# Patient Record
Sex: MALE | Race: WHITE | Employment: OTHER | URBAN - METROPOLITAN AREA
[De-identification: names, ages, dates, MRNs, and addresses within clinical notes are randomized per-mention and may not be internally consistent; named-entity substitution may affect disease eponyms.]

---

## 2021-06-03 ENCOUNTER — TRANSCRIBE ORDER (OUTPATIENT)
Dept: SCHEDULING | Age: 71
End: 2021-06-03

## 2021-06-03 DIAGNOSIS — Z01.89 RADIOLOGICAL EXAMINATION, NOT ELSEWHERE CLASSIFIED: Primary | ICD-10-CM

## 2021-06-09 ENCOUNTER — HOSPITAL ENCOUNTER (OUTPATIENT)
Dept: MRI IMAGING | Age: 71
Discharge: HOME | End: 2021-06-09
Attending: INTERNAL MEDICINE

## 2021-06-09 DIAGNOSIS — Z01.89 RADIOLOGICAL EXAMINATION, NOT ELSEWHERE CLASSIFIED: ICD-10-CM

## 2022-12-03 ENCOUNTER — HOSPITAL ENCOUNTER (EMERGENCY)
Age: 72
Discharge: HOME | End: 2022-12-03
Attending: EMERGENCY MEDICINE
Payer: MEDICARE

## 2022-12-03 ENCOUNTER — APPOINTMENT (OUTPATIENT)
Dept: GENERAL RADIOLOGY | Age: 72
End: 2022-12-03
Attending: EMERGENCY MEDICINE
Payer: MEDICARE

## 2022-12-03 VITALS
BODY MASS INDEX: 38.51 KG/M2 | RESPIRATION RATE: 18 BRPM | HEIGHT: 69 IN | HEART RATE: 112 BPM | SYSTOLIC BLOOD PRESSURE: 132 MMHG | WEIGHT: 260 LBS | DIASTOLIC BLOOD PRESSURE: 64 MMHG | TEMPERATURE: 98.2 F | OXYGEN SATURATION: 94 %
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
## 2022-12-03 DIAGNOSIS — M25.559 HIP PAIN: Primary | ICD-10-CM

## 2022-12-03 PROCEDURE — 73502 X-RAY EXAM HIP UNI 2-3 VIEWS: CPT

## 2022-12-03 PROCEDURE — 99283 EMERGENCY DEPT VISIT LOW MDM: CPT

## 2022-12-03 RX ORDER — ASPIRIN 325 MG
325 TABLET ORAL DAILY
COMMUNITY

## 2022-12-03 NOTE — ED NOTES
Pt given discharge instructions, patient education, 0 prescriptions, and follow up information. Pt verbalizes understanding. All questions answered. Pt discharged to home in private vehicle, ambulatory. Pt A&Ox4, RA, pain controlled.

## 2022-12-03 NOTE — ED PROVIDER NOTES
Date of Service:  12/3/2022    Patient:  Eulalia Zimmerman    Chief Complaint:  Fall and Hip Pain       HPI:  Eulalia Zimmerman is a 71 y.o.  male who presents for evaluation of left hip pain. Patient had a fall getting out of the shower when he states that his left hip/leg gave out on him. He notes chronic issues with his left hip with chronic discomfort. The pain he is experiencing now is the normal type of pain he has only it is worse today since the fall. No numbness or tingling. Patient able to ambulate. He denies head strike loss of consciousness any other head neck or back pain. No other acute complaints       History reviewed. No pertinent past medical history. History reviewed. No pertinent surgical history. History reviewed. No pertinent family history. Social History     Socioeconomic History    Marital status:      Spouse name: Not on file    Number of children: Not on file    Years of education: Not on file    Highest education level: Not on file   Occupational History    Not on file   Tobacco Use    Smoking status: Never    Smokeless tobacco: Never   Vaping Use    Vaping Use: Never used   Substance and Sexual Activity    Alcohol use: Not on file    Drug use: Never    Sexual activity: Not on file   Other Topics Concern    Not on file   Social History Narrative    Not on file     Social Determinants of Health     Financial Resource Strain: Not on file   Food Insecurity: Not on file   Transportation Needs: Not on file   Physical Activity: Not on file   Stress: Not on file   Social Connections: Not on file   Intimate Partner Violence: Not on file   Housing Stability: Not on file         ALLERGIES: Patient has no known allergies. Review of Systems   All other systems reviewed and are negative.     Vitals:    12/03/22 0945 12/03/22 0947 12/03/22 1002   BP: 134/62 134/82 127/65   Pulse: (!) 112     Resp: 18     Temp: 98.2 °F (36.8 °C)     SpO2: 94% 96% 94%   Weight: 117.9 kg (260 lb) Height: 5' 9\" (1.753 m)              Physical Exam  Vitals and nursing note reviewed. Constitutional:       Appearance: Normal appearance. HENT:      Head: Normocephalic and atraumatic. Eyes:      General: No scleral icterus. Cardiovascular:      Rate and Rhythm: Normal rate. Pulmonary:      Effort: Pulmonary effort is normal.   Abdominal:      General: There is no distension. Musculoskeletal:         General: Tenderness (pain about the left hip) present. No swelling. Normal range of motion. Skin:     General: Skin is warm. Neurological:      Mental Status: He is alert and oriented to person, place, and time. Sensory: No sensory deficit. Motor: No weakness. Psychiatric:         Mood and Affect: Mood normal.        MDM     VITAL SIGNS:  Patient Vitals for the past 4 hrs:   BP SpO2   12/03/22 1046 132/64 94 %   12/03/22 1038 -- 92 %         LABS:  No results found for this or any previous visit (from the past 6 hour(s)). IMAGING:  XR HIP LT W OR WO PELV 2-3 VWS   Final Result   severe left hip osteoarthritis with flattening of the superior femoral head   articular surface, may reflect an age indeterminate subchondral collapse. Medications During Visit:  Medications - No data to display      DECISION MAKING:  Tatianna Trinh is a 71 y.o. male who comes in as above. Chronic left hip pain, worse today after fall but no acute findings. Patient has orthopedic follow-up next week to discuss private options for replacement [facility]      IMPRESSION:  1. Hip pain    2.  Fall, initial encounter        DISPOSITION:  Discharged      Discharge Medication List as of 12/3/2022 11:21 AM           Follow-up Information       Follow up With Specialties Details Why Contact Info    Your PCP  Schedule an appointment as soon as possible for a visit       Your Specialist  Schedule an appointment as soon as possible for a visit                 The patient is asked to follow-up with their primary care provider in the next several days. They are to call tomorrow for an appointment. The patient is asked to return promptly for any increased concerns or worsening of symptoms. They can return to this emergency department or any other emergency department.       Procedures

## 2022-12-03 NOTE — ED TRIAGE NOTES
Pt ambulatory with cane into ED w/ cc of left hip pain due to GLF. Denies dizziness or chest pain. Pt states pain is 6/10. Pain does not radiate. Pt states he has had a \"bad hip\" for the past year. Pt was in the shower and fell down. Denies LOC and slide down on his arm.

## 2022-12-12 ENCOUNTER — OFFICE VISIT (OUTPATIENT)
Dept: ORTHOPEDIC SURGERY | Age: 72
End: 2022-12-12
Payer: MEDICARE

## 2022-12-12 VITALS — WEIGHT: 260 LBS | BODY MASS INDEX: 38.51 KG/M2 | HEIGHT: 69 IN

## 2022-12-12 DIAGNOSIS — M16.12 ARTHRITIS OF LEFT HIP: Primary | ICD-10-CM

## 2022-12-12 PROCEDURE — 3017F COLORECTAL CA SCREEN DOC REV: CPT | Performed by: ORTHOPAEDIC SURGERY

## 2022-12-12 PROCEDURE — G8536 NO DOC ELDER MAL SCRN: HCPCS | Performed by: ORTHOPAEDIC SURGERY

## 2022-12-12 PROCEDURE — G8417 CALC BMI ABV UP PARAM F/U: HCPCS | Performed by: ORTHOPAEDIC SURGERY

## 2022-12-12 PROCEDURE — 99205 OFFICE O/P NEW HI 60 MIN: CPT | Performed by: ORTHOPAEDIC SURGERY

## 2022-12-12 PROCEDURE — 1101F PT FALLS ASSESS-DOCD LE1/YR: CPT | Performed by: ORTHOPAEDIC SURGERY

## 2022-12-12 PROCEDURE — G8427 DOCREV CUR MEDS BY ELIG CLIN: HCPCS | Performed by: ORTHOPAEDIC SURGERY

## 2022-12-12 PROCEDURE — 1123F ACP DISCUSS/DSCN MKR DOCD: CPT | Performed by: ORTHOPAEDIC SURGERY

## 2022-12-12 PROCEDURE — G8432 DEP SCR NOT DOC, RNG: HCPCS | Performed by: ORTHOPAEDIC SURGERY

## 2022-12-12 RX ORDER — ATORVASTATIN CALCIUM 80 MG/1
TABLET, FILM COATED ORAL
COMMUNITY
Start: 2022-11-30

## 2022-12-12 RX ORDER — CARVEDILOL 25 MG/1
25 TABLET ORAL 2 TIMES DAILY WITH MEALS
COMMUNITY

## 2022-12-12 RX ORDER — GABAPENTIN 300 MG/1
CAPSULE ORAL
COMMUNITY
Start: 2022-10-05

## 2022-12-12 RX ORDER — METFORMIN HYDROCHLORIDE 500 MG/1
TABLET ORAL
COMMUNITY
Start: 2022-09-14

## 2022-12-12 RX ORDER — GUAIFENESIN 600 MG/1
600 TABLET, EXTENDED RELEASE ORAL 2 TIMES DAILY
COMMUNITY

## 2022-12-12 RX ORDER — LISINOPRIL 40 MG/1
TABLET ORAL
COMMUNITY
Start: 2022-04-25 | End: 2023-04-26

## 2022-12-12 RX ORDER — FOLIC ACID 1 MG/1
TABLET ORAL
COMMUNITY
Start: 2022-10-26

## 2022-12-12 RX ORDER — FINASTERIDE 5 MG/1
TABLET, FILM COATED ORAL
COMMUNITY
Start: 2022-11-30

## 2022-12-12 RX ORDER — CHOLECALCIFEROL (VITAMIN D3) 125 MCG
CAPSULE ORAL
COMMUNITY

## 2022-12-12 RX ORDER — IBUPROFEN 400 MG/1
TABLET ORAL
COMMUNITY
Start: 2022-03-23 | End: 2023-03-24

## 2022-12-12 NOTE — PROGRESS NOTES
Soniya Sainz (: 1950) is a 71 y.o. male patient, here for evaluation of the following chief complaint(s):  Hip Pain (Left hip pain/)       ASSESSMENT/PLAN:  Below is the assessment and plan developed based on review of pertinent history, physical exam, labs, studies, and medications. 29-year-old male comes in today complaining of left-sided hip pain. He has been doing this for several years at the [address] [place] [address].  He says he has had multiple steroid injections into the left hip. He says the pain is severe with stairs as well as severe while walking on uneven surface. He has extreme pain rising from sitting as well as bending over to  an object. He has moderate to severe pain while lying in bed and sitting. He walks with a severe limp. He takes metformin and has diabetes but says his blood sugars are well controlled. His x-rays reveal severe destruction of his left hip joint with bone-on-bone changes and erosion of the acetabulum. Discussed these findings with him. He is very interested in moving forward surgery. He understands significant risk because of diabetes BMI and bony destruction of the hip joint. He would like to move forward with this. He wants to schedule this at [facility].  We will also get an ESR and CRP considering his significant hip changes. Risks and benefits of joint arthroplasty discussed at length including but not limited to bleeding, need for blood transfusion, infection, damage to surrounding structures, intra-operative fracture, blood clots, pulmonary embolism, death. The patient understands the risks of surgery. All questions answered. They elected to move forward. 1. Arthritis of left hip      Encounter Diagnosis   Name Primary? Arthritis of left hip Yes        No follow-ups on file.       SUBJECTIVE/OBJECTIVE:  Soniya Sainz (: 1950) is a 71 y.o. male who presents today for the following:  Chief Complaint   Patient presents with    Hip Pain     Left hip pain         80-year-old male comes in today complaining of left-sided hip pain. He has been doing this for several years at the [facility].  He says he has had multiple steroid injections into the left hip. He says the pain is severe with stairs as well as severe while walking on uneven surface. He has extreme pain rising from sitting as well as bending over to  an object. He has moderate to severe pain while lying in bed and sitting. He walks with a severe limp. He takes metformin and has diabetes but says his blood sugars are well controlled. IMAGING:    I independently reviewed his x-rays from an outside source. These reveal severe changes of the left hip with significant hip joint destruction and no remaining joint space. He has bone-on-bone with large osteophyte and bone spur formation as well as subchondral cysts    XR Results (most recent):  Results from Hospital Encounter encounter on 12/03/22    XR HIP LT W OR WO PELV 2-3 VWS    Narrative  EXAM: XR HIP LT W OR WO PELV 2-3 VWS    INDICATION: fall, left hip[ pain. COMPARISON: None. FINDINGS: AP view of the pelvis and a frogleg lateral view of the left hip  demonstrate severe left hip osteoarthritis with flattening of the superior  femoral head articular surface, may reflect an age indeterminate subchondral  collapse. No malalignment. Vascular calcifications. Impression  severe left hip osteoarthritis with flattening of the superior femoral head  articular surface, may reflect an age indeterminate subchondral collapse.        No Known Allergies    Current Outpatient Medications   Medication Sig    gabapentin (NEURONTIN) 300 mg capsule     ibuprofen (MOTRIN) 400 mg tablet TAKE ONE TABLET BY MOUTH THREE TIMES A DAY AS NEEDED FOR PAIN AS DIRECTED    folic acid (FOLVITE) 1 mg tablet     metFORMIN (GLUCOPHAGE) 500 mg tablet     lisinopriL (PRINIVIL, ZESTRIL) 40 mg tablet TAKE ONE TABLET BY MOUTH ONCE DAILY WILL REPLACE ENALAPRIL. THIS IS FOR BLOOD PRESSURE AND TO PROTECT THE  KIDNEYS    finasteride (PROSCAR) 5 mg tablet     atorvastatin (LIPITOR) 80 mg tablet     cholecalciferol, vitamin D3, (Vitamin D3) 50 mcg (2,000 unit) tab Take  by mouth.    guaiFENesin ER (Mucinex) 600 mg ER tablet Take 600 mg by mouth two (2) times a day. carvediloL (COREG) 25 mg tablet Take 25 mg by mouth two (2) times daily (with meals). aspirin (ASPIRIN) 325 mg tablet Take 325 mg by mouth daily. No current facility-administered medications for this visit. No past medical history on file. No past surgical history on file. No family history on file. Social History     Tobacco Use    Smoking status: Never    Smokeless tobacco: Never   Substance Use Topics    Alcohol use: Not on file        All systems reviewed x 12 and were negative with the exception of None      No flowsheet data found. Vitals:  Ht 5' 9\" (1.753 m)   Wt 260 lb (117.9 kg)   BMI 38.40 kg/m²    Body mass index is 38.4 kg/m². Physical Exam    General: NAD, well developed, well nourished. Cardiac: Extremities well perfused. Respiratory: Nonlabored breathing. RLE: No antalgic gait. Negative stinchfield. 0-100 degrees of flexion. >20 degrees internal rotation, >30 degrees external rotation. Negative ANNAMARIE. No pain with flexion adduction and internal rotation. .  Motor strength grossly intact. LLE: Obvious antalgic gait.  stinchfield. Left shorter than right clinically. 0-100 degrees of flexion. 0 degrees internal rotation, >30 degrees external rotation. Negative ANNAMARIE. Severe pain with flexion adduction and internal rotation. .  Motor strength grossly intact. Skin: Warm well perfused. Vascular: Palpable pedal pulses bilaterally. Equal. Capillary refill less than 2 seconds. An electronic signature was used to authenticate this note.   -- Ancelmo Finn MD

## 2022-12-14 DIAGNOSIS — M16.12 ARTHRITIS OF LEFT HIP: Primary | ICD-10-CM

## 2022-12-20 ENCOUNTER — HOSPITAL ENCOUNTER (OUTPATIENT)
Dept: PREADMISSION TESTING | Age: 72
Discharge: HOME | End: 2022-12-20
Attending: ORTHOPAEDIC SURGERY
Payer: MEDICARE

## 2022-12-20 VITALS
RESPIRATION RATE: 20 BRPM | TEMPERATURE: 97.7 F | DIASTOLIC BLOOD PRESSURE: 81 MMHG | OXYGEN SATURATION: 99 % | WEIGHT: 268.52 LBS | BODY MASS INDEX: 42.15 KG/M2 | HEART RATE: 81 BPM | HEIGHT: 67 IN | SYSTOLIC BLOOD PRESSURE: 158 MMHG

## 2022-12-20 LAB
ABO + RH BLD: NORMAL
ALBUMIN SERPL-MCNC: 3.7 G/DL (ref 3.5–5)
ALBUMIN/GLOB SERPL: 0.9 {RATIO} (ref 1.1–2.2)
ALP SERPL-CCNC: 84 U/L (ref 45–117)
ALT SERPL-CCNC: 22 U/L (ref 12–78)
ANION GAP SERPL CALC-SCNC: 7 MMOL/L (ref 5–15)
APPEARANCE UR: CLEAR
APTT PPP: 26.5 SEC (ref 22.1–31)
AST SERPL-CCNC: 12 U/L (ref 15–37)
BACTERIA URNS QL MICRO: NEGATIVE /HPF
BASOPHILS # BLD: 0.1 K/UL (ref 0–0.1)
BASOPHILS NFR BLD: 1 % (ref 0–1)
BILIRUB SERPL-MCNC: 0.6 MG/DL (ref 0.2–1)
BILIRUB UR QL: NEGATIVE
BLOOD GROUP ANTIBODIES SERPL: NORMAL
BUN SERPL-MCNC: 18 MG/DL (ref 6–20)
BUN/CREAT SERPL: 24 (ref 12–20)
CALCIUM SERPL-MCNC: 9.4 MG/DL (ref 8.5–10.1)
CHLORIDE SERPL-SCNC: 105 MMOL/L (ref 97–108)
CO2 SERPL-SCNC: 28 MMOL/L (ref 21–32)
COLOR UR: NORMAL
COMMENT, HOLDF: NORMAL
CREAT SERPL-MCNC: 0.75 MG/DL (ref 0.7–1.3)
CRP SERPL-MCNC: 1.61 MG/DL (ref 0–0.6)
DIFFERENTIAL METHOD BLD: ABNORMAL
EOSINOPHIL # BLD: 0.1 K/UL (ref 0–0.4)
EOSINOPHIL NFR BLD: 1 % (ref 0–7)
EPITH CASTS URNS QL MICRO: NORMAL /LPF
ERYTHROCYTE [DISTWIDTH] IN BLOOD BY AUTOMATED COUNT: 14.9 % (ref 11.5–14.5)
ERYTHROCYTE [SEDIMENTATION RATE] IN BLOOD: 43 MM/HR (ref 0–20)
EST. AVERAGE GLUCOSE BLD GHB EST-MCNC: 97 MG/DL
GLOBULIN SER CALC-MCNC: 4.2 G/DL (ref 2–4)
GLUCOSE SERPL-MCNC: 93 MG/DL (ref 65–100)
GLUCOSE UR STRIP.AUTO-MCNC: NEGATIVE MG/DL
HBA1C MFR BLD: 5 % (ref 4–5.6)
HCT VFR BLD AUTO: 35.8 % (ref 36.6–50.3)
HGB BLD-MCNC: 11.3 G/DL (ref 12.1–17)
HGB UR QL STRIP: NEGATIVE
HYALINE CASTS URNS QL MICRO: NORMAL /LPF (ref 0–2)
IMM GRANULOCYTES # BLD AUTO: 0 K/UL (ref 0–0.04)
IMM GRANULOCYTES NFR BLD AUTO: 0 % (ref 0–0.5)
INR PPP: 1 (ref 0.9–1.1)
KETONES UR QL STRIP.AUTO: NEGATIVE MG/DL
LEUKOCYTE ESTERASE UR QL STRIP.AUTO: NEGATIVE
LYMPHOCYTES # BLD: 1 K/UL (ref 0.8–3.5)
LYMPHOCYTES NFR BLD: 12 % (ref 12–49)
MCH RBC QN AUTO: 30.8 PG (ref 26–34)
MCHC RBC AUTO-ENTMCNC: 31.6 G/DL (ref 30–36.5)
MCV RBC AUTO: 97.5 FL (ref 80–99)
MONOCYTES # BLD: 0.8 K/UL (ref 0–1)
MONOCYTES NFR BLD: 9 % (ref 5–13)
NEUTS SEG # BLD: 6.4 K/UL (ref 1.8–8)
NEUTS SEG NFR BLD: 77 % (ref 32–75)
NITRITE UR QL STRIP.AUTO: NEGATIVE
NRBC # BLD: 0 K/UL (ref 0–0.01)
NRBC BLD-RTO: 0 PER 100 WBC
PH UR STRIP: 5.5 [PH] (ref 5–8)
PLATELET # BLD AUTO: 260 K/UL (ref 150–400)
PMV BLD AUTO: 10.4 FL (ref 8.9–12.9)
POTASSIUM SERPL-SCNC: 3.8 MMOL/L (ref 3.5–5.1)
PROT SERPL-MCNC: 7.9 G/DL (ref 6.4–8.2)
PROT UR STRIP-MCNC: NEGATIVE MG/DL
PROTHROMBIN TIME: 10.8 SEC (ref 9–11.1)
RBC # BLD AUTO: 3.67 M/UL (ref 4.1–5.7)
RBC #/AREA URNS HPF: NORMAL /HPF (ref 0–5)
SAMPLES BEING HELD,HOLD: NORMAL
SODIUM SERPL-SCNC: 140 MMOL/L (ref 136–145)
SP GR UR REFRACTOMETRY: 1.03
SPECIMEN EXP DATE BLD: NORMAL
THERAPEUTIC RANGE,PTTT: NORMAL SECS (ref 58–77)
UA: UC IF INDICATED,UAUC: NORMAL
UROBILINOGEN UR QL STRIP.AUTO: 0.2 EU/DL (ref 0.2–1)
WBC # BLD AUTO: 8.4 K/UL (ref 4.1–11.1)
WBC URNS QL MICRO: NORMAL /HPF (ref 0–4)

## 2022-12-20 PROCEDURE — 86900 BLOOD TYPING SEROLOGIC ABO: CPT

## 2022-12-20 PROCEDURE — 85610 PROTHROMBIN TIME: CPT

## 2022-12-20 PROCEDURE — 80053 COMPREHEN METABOLIC PANEL: CPT

## 2022-12-20 PROCEDURE — 85730 THROMBOPLASTIN TIME PARTIAL: CPT

## 2022-12-20 PROCEDURE — 86140 C-REACTIVE PROTEIN: CPT

## 2022-12-20 PROCEDURE — 97161 PT EVAL LOW COMPLEX 20 MIN: CPT

## 2022-12-20 PROCEDURE — 81001 URINALYSIS AUTO W/SCOPE: CPT

## 2022-12-20 PROCEDURE — 36415 COLL VENOUS BLD VENIPUNCTURE: CPT

## 2022-12-20 PROCEDURE — 85652 RBC SED RATE AUTOMATED: CPT

## 2022-12-20 PROCEDURE — 93005 ELECTROCARDIOGRAM TRACING: CPT

## 2022-12-20 PROCEDURE — 83036 HEMOGLOBIN GLYCOSYLATED A1C: CPT

## 2022-12-20 PROCEDURE — 85025 COMPLETE CBC W/AUTO DIFF WBC: CPT

## 2022-12-20 RX ORDER — SODIUM CHLORIDE, SODIUM LACTATE, POTASSIUM CHLORIDE, CALCIUM CHLORIDE 600; 310; 30; 20 MG/100ML; MG/100ML; MG/100ML; MG/100ML
25 INJECTION, SOLUTION INTRAVENOUS CONTINUOUS
OUTPATIENT
Start: 2023-01-03

## 2022-12-20 RX ORDER — TAMSULOSIN HYDROCHLORIDE 0.4 MG/1
0.8 CAPSULE ORAL DAILY
COMMUNITY

## 2022-12-20 RX ORDER — GUAIFENESIN 600 MG/1
600 TABLET, EXTENDED RELEASE ORAL
COMMUNITY

## 2022-12-20 NOTE — PERIOP NOTES

## 2022-12-20 NOTE — PERIOP NOTES
[facility]  Joint/Spine Preoperative Instructions    Surgery Date January 3          Time of Arrival to be called  Contact#[phone]  1. On the day of your surgery, please report to the Surgical Services Registration Desk and sign in at your designated time. The Surgery Center is located to the right of the Emergency Room. 2. You must have someone with you to drive you home. You should not drive a car for 24 hours following surgery. Please make arrangements for a friend or family member to stay with you for the first 24 hours after your surgery. 3. No food after midnight January 2  Medications morning of surgery should be taken with a sip of water. Please follow pre-surgery drink instructions that were given at your Pre Admission Testing appointment. 4. We recommend you do not drink any alcoholic beverages for 24 hours before and after your surgery. 5. Contact your surgeons office for instructions on the following medications: non-steroidal anti-inflammatory drugs (i.e. Advil, Aleve), vitamins, and supplements. (Some surgeons will want you to stop these medications prior to surgery and others may allow you to take them)  **If you are currently taking Plavix, Coumadin, Aspirin and/or other blood-thinning agents, contact your surgeon for instructions. ** Your surgeon will partner with the physician prescribing these medications to determine if it is safe to stop or if you need to continue taking. Please do not stop taking these medications without instructions from your surgeon    6. Wear comfortable clothes. Wear glasses instead of contacts. Do not bring any money or jewelry. Please bring picture ID, insurance card, and any prearranged co-payment or hospital payment. Do not wear make-up, particularly mascara the morning of your surgery. Do not wear nail polish, particularly if you are having foot /hand surgery.   Wear your hair loose or down, no ponytails, buns, gaston pins or clips. All body piercings must be removed. Please shower with antibacterial soap for three consecutive days before and on the morning of surgery, but do not apply any lotions, powders or deodorants after the shower on the day of surgery. Please use a fresh towels after each shower. Please sleep in clean clothes and change bed linens the night before surgery. Please do not shave for 48 hours prior to surgery. Shaving of the face is acceptable. 7. You should understand that if you do not follow these instructions your surgery may be cancelled. If your physical condition changes (I.e. fever, cold or flu) please contact your surgeon as soon as possible. 8. It is important that you be on time. If a situation occurs where you may be late, please call [phone] (OR Holding Area). 9. If you have any questions and or problems, please call [phone] (Pre-admission Testing). 10. Your surgery time may be subject to change. You will receive a phone call the evening prior with your time of arrival.    11.  If having outpatient surgery, you must have someone to drive you here, stay with you during the duration of your stay, and to drive you home at time of discharge. 12. The following link is for the educational video for patients and/or families. [email or web address].[email or web address]-fcodbib-[email or web address]-materials    Special Instructions: Follow your physician/surgeon instructions for holding all non-steroidal anti-inflammatory drugs/NSAIDs, blood-thinning agents, vitamins & supplements prior to surgery.      NEED to get directions on ASPIRIN,Ibuprofen, and Salfasalazine from Dr Anderson Mina incentive spirometry twice a day -ten times each and bring day of surgery    TAKE ALL MEDICATIONS THE DAY OF SURGERY EXCEPT:Metformin,Vitamins/supplements  (Take Lisinopril, Coreg with small sip of water or presurgery drinks)      I understand a pre-operative phone call will be made to verify my surgery time. In the event that I am not available, I give permission for a message to be left on my answering service and/or with another person?   yes         ___________________      __________   _________    (Signature of Patient)             (Witness)                (Date and Time)

## 2022-12-20 NOTE — PERIOP NOTES
Orthopedic and Spine Patients: Instructions on When You Can   Eat or Drink Before Surgery      You have been provided a pre-surgery drink received at your pre-admission testing appointment. Night before surgery: You should drink 1/2 bottle of the  pre-surgery drink at bedtime. No food after midnight! Day of Surgery:  Complete 2nd half of the bottle of the pre-surgery drink 1 hour prior to arrival at hospital.  For questions call Pre-Admission Testing at [phone]. They are available from 8:00am-5:00pm, Monday through Friday.

## 2022-12-20 NOTE — PERIOP NOTES

## 2022-12-20 NOTE — PROGRESS NOTES
Problem: At Risk for Falls  Goal: # Patient does not fall  Outcome: Outcome Met, Continue evaluating goal progress toward completion  Goal: # Takes action to control fall-related risks  Outcome: Outcome Met, Continue evaluating goal progress toward completion     Problem: Breathing Pattern Ineffective  Goal: Air exchange is effective, demonstrated by Sp02 sat of greater then or = 92% (or as ordered)  Outcome: Outcome Met, Continue evaluating goal progress toward completion  Goal: Respiratory pattern is quiet and regular without report of SOB  Outcome: Outcome Met, Continue evaluating goal progress toward completion  Goal: Breathing pattern demonstrates minimal apnea during sleep with appropriate use of airway pressure support devices  Outcome: Outcome Met, Continue evaluating goal progress toward completion     Problem: VTE, Risk for  Goal: # No s/s of VTE  Outcome: Outcome Met, Continue evaluating goal progress toward completion  Goal: Demonstrates ability to administer injectable anticoagulants if ordered for d/c  Description: Document education using the patient education activity.  Outcome: Outcome Met, Continue evaluating goal progress toward completion     Problem: Fluid Volume Deficit  Goal: Vital signs are maintained within parameters  Outcome: Outcome Met, Continue evaluating goal progress toward completion  Goal: Oral intake is resumed and tolerated  Outcome: Outcome Met, Continue evaluating goal progress toward completion  Goal: Genitourinary function returned to baseline  Outcome: Outcome Met, Continue evaluating goal progress toward completion      Patient attended the Joint Replacement Education Class at [facility]. The content of the class was presented using a power point presentation specific for patients undergoing hip and knee replacement surgery. The hospitals Joint Replacement Education Handbook was given to the patient. Preparing for surgery, day of surgery routine and expectations, discharge process and help at home expectations, nutrition,medications, infection control, pain management, DVT prevention, ice therapy and safety were reviewed in class. Opportunity for questions provided, patient verbalized understanding of instructions.

## 2022-12-20 NOTE — PERIOP NOTES
Hibiclens/Chlorhexidine    Preventing Infections Before and After - Your Surgery    IMPORTANT INSTRUCTIONS    Please read and follow these instructions carefully. If you are unable to comply with the below instructions your procedure will be cancelled. Every Night for Three (3) nights before your surgery:  Shower with an antibacterial soap, such as Dial, or the soap provided at your preassessment appointment. A shower is better than a bath for cleaning your skin. If needed, ask someone to help you reach all areas of your body. Dont forget to clean your belly button with every shower. The night before your surgery: If you lose your Hibiclens/chlorhexidine please contact surgery center or you can purchase it at a local pharmacy  On the night before your surgery, shower with an antibacterial soap, such as Dial, or the soap provided at your preassessment appointment. With one packet of Hibiclens/Chlorhexidine in hand, turn water off. Apply Hibiclens antiseptic skin cleanser with a clean, freshly washed washcloth. Gently apply to your body from chin to toes (except the genital area) and especially the area(s) where your incision(s) will be. Leave Hibiclens/Chlorhexidine on your skin for at least 20 seconds. CAUTION: If needed, Hibiclens/chlorhexidine may be used to clean the folds of skin of the legs (such as in the area of the groin) and on your buttocks and hips. However, do not use Hibiclens/Chlorhexidine above the neck or in the genital area (your bottom) or put inside any area of your body. Turn the water back on and rinse. Dry gently with a clean, freshly washed towel. After your shower, do not use any powder, deodorant, perfumes or lotion. Use clean, freshly washed towels and washcloths every time you shower. Wear clean, freshly washed pajamas to bed the night before surgery. Sleep on clean, freshly washed sheets. Do not allow pets to sleep in your bed with you.         The Morning of your surgery:  Shower again thoroughly with an antibacterial soap, such as Dial or the soap provided at your preassessment appointment. If needed, ask someone for help to reach all areas of your body. Dont forget to clean your belly button! Rinse. Dry gently with a clean, freshly washed towel. After your shower, do not use any powder, deodorant, perfumes or lotion prior to surgery. Put on clean, freshly washed clothing. Tips to help prevent infections after your surgery:  Protect your surgical wound from germs:  Hand washing is the most important thing you and your caregivers can do to prevent infections. Keep your bandage clean and dry! Do not touch your surgical wound. Use clean, freshly washed towels and washcloths every time you shower; do not share bath linens with others. Until your surgical wound is healed, wear clothing and sleep on bed linens each day that are clean and freshly washed. Do not allow pets to sleep in your bed with you or touch your surgical wound. Do not smoke - smoking delays wound healing. This may be a good time to stop smoking. If you have diabetes, it is important for you to manage your blood sugar levels properly before your surgery as well as after your surgery. Poorly managed blood sugar levels slow down wound healing and prevent you from healing completely. If you lose your Hibiclens/chlorhexidine, please call the [facility], or it is available for purchase at your pharmacy.                ___________________      ___________________      ________________  (Signature of Patient)          (Witness)                   (Date and Time)

## 2022-12-20 NOTE — PROGRESS NOTES
[facility]  Physical Therapy Pre-surgery evaluation  [address]  [place], [address] [facility]    PHYSICAL THERAPY PRE THR SURGERY EVALUATION  Patient: Maria De Jesus Parker (72 y.o. male)  Date: 12/20/2022  Primary Diagnosis: PAT  Procedure(s) (LRB):  LEFT TOTAL HIP ARTHROPLASTY ANTERIOR APPROACH (Left)     Precautions: None       ASSESSMENT :  Based on the objective data described below, the patient presents with increased fall risk, impaired gait, balance, pain, and overall high level functional mobility due to end stage degenerative joint disease in the left hip. Discussed anticipated disposition to home with possible discharge within a 1 to 2 day time frame post-surgery. Patient in agreement, patient has no  arranged. Reports his current plan is to have his son check in on him every evening. Therapist strongly encouraging patient to arrange for more help and suggested have son & DIL stay with him for a few days instead of only having them check on him 1x/day. GOALS: (Goals have been discussed and agreed upon with patient.)  DISCHARGE GOALS: Time Frame: 1 DAY  Patient will demonstrate increased strength, range of motion, and pain control via a home exercise program in order to minimize functional deficits in preparation for their upcoming surgery. This will be achieved by using education, demonstration and through the use of an informational handout including a home exercise program.  REHABILITATION POTENTIAL FOR STATED GOALS: Good     RECOMMENDATIONS AND PLANNED INTERVENTIONS: (Benefits and precautions of physical therapy have been discussed with the patient.)  Home Exercise Program  TREATMENT PLAN EFFECTIVE DATES: 12/20/2022 TO 12/20/2022  FREQUENCY/DURATION: Patient to continue to perform home exercise program at least twice daily until his surgery. SUBJECTIVE:   Patient stated I did fall out of the shower 2 weeks ago, that is why I am here.     OBJECTIVE DATA SUMMARY: HISTORY:    Past Medical History:   Diagnosis Date    At risk for sleep apnea     DEVAUGHN 4- was tested years ago- and none    Autoimmune disease ([facility] [address]     rheumatoid arthritis    Bell's palsy     Diabetes ([facility] [address]     \"prediabetic\"    Hypertension     Lyme disease     Psoriasis     Psychiatric disorder     anxiety    PUD (peptic ulcer disease)     Staph infection     left arm     Past Surgical History:   Procedure Laterality Date    HX HEART CATHETERIZATION      HX ORTHOPAEDIC      left arm repair     Prior Level of Function/Home Situation: Indep with ambulation, intermittently uses cane depending on how badly his hip is hurting. Indep with ADLs. On bad days the pain is pretty constant and increases with prolonged sitting. Jey Shoulder out of shower 2 weeks ago, he is not entirely sure what happened but it was when he was stepping over the lip into the shower. Personal factors and/or comorbidities impacting plan of care: Lives alone, hx of falls, Anxiety    Home Situation  Home Environment: Private residence  # Steps to Enter: 2  Rails to Enter: No  Wheelchair Ramp: Yes  One/Two Story Residence: Two story (bedroom and shower upstairs)  # of Interior Steps: 15  Interior Rails: Right  Living Alone: Yes  Support Systems: Child(carmina) (Son)  Current DME Used/Available at Home: Grab bars, Walker, rollator, Cane, straight  Tub or Shower Type: Shower    EXAMINATION/PRESENTATION/DECISION MAKING:     ADLs (Current Functional Status): Bathing/Showering:   [x] Independent  [] Requires Assistance from Someone  [] Sponge Bath Only   Ambulation:  [x] Independent  [] Walk Indoors Only  [] Walk Outdoors  [] Use Assistive Device  [] Use Wheelchair Only     Dressing:   [x] Independent    Requires Assistance from Someone for:  [] Sock/Shoes  [] Pants  [] Everything   Household Activities:  [] Routine house and yard work  [x] Light Housework Only  [] None     Strength:    Strength:  Within functional limits (L hip 3/5)  Tone & Sensation:   Tone: Normal  Sensation: Intact  Range Of Motion:  AROM: Generally decreased, functional (L hip)  Coordination:  Coordination: Within functional limits    Functional Mobility:  Transfers:  Sit to Stand: Modified independent  Stand to Sit: Modified independent  Balance:   Sitting: Intact  Standing: Intact  Ambulation/Gait Training:  Distance (ft): 150 Feet (ft)  Ambulation - Level of Assistance: Independent  Gait Abnormalities: Antalgic, Altered arm swing, Trunk sway increased  Base of Support: Widened, Shift to right  Speed/Jeri: Slow    Therapeutic Exercises:   The patient was educated in, has demonstrated, and has received written instructions to complete for their home exercise program per total hip replacement protocol. Functional Measure:  10 Meter walk test:  (Specify if any supplemental oxygen is used, the type, pre, during and post sats.)    Self-Selected Or Fast-Velocity: Self Selected Velocity  Trial 1 (Time to Walk 10 Meters): 17.5 Seconds  Trial 2 (Time to Walk 10 Meters): 17.66 Seconds  Trial 3 (Time to Walk 10 Meters): 15.52 Seconds  Average : 16.9 Seconds  Score (Meters/Second): 0.6 Meters/Second           Minimal Detectable Change (MDC-90) = 0.1 m/s  Roshan PRESTON. \"Comfortable and maximum walking speed of adults aged 20-79 years: reference values and determinants. \" Age and Agin Volume 26(1):15-9. Viky Krishnamurthy. \"Age- and gender-related test performance in community-dwelling elderly people: Six-Minute Walk Test, Byers Balance Scale, Timed Up & Go Test, and gait speeds. \" Physical Therapy: 2002 Volume 82(2):128-37. Dia MESSINA, Lyly OSUNA, Thea Augustine JD, [facility] KINGA. \"Assessing stability and change of four performance measures: a longitudinal study evaluating outcome following total hip and knee arthroplasty. \" [facility] Musculoskeletal Disorders: 2005 Volume 6(3).   Joanne Villalba, PhD; Sherryle Coyer, PhD. Patsy Segura Paper: \"Walking Speed: the Sixth Vital Sign\" Journal of Geriatric Physical Therapy: 2009 - Volume 32 - Issue 2 - p 2-5 . Pain:  Pain Scale 1: Numeric (0 - 10)  Pain Intensity 1: 6  Pain Location 1: Hip  Pain Orientation 1: Left  Pain Description 1: Aching; Shooting     Activity Tolerance:   Fair, limited by hip pain   Patient []   does  [x]   does not demonstrate signs/symptoms of shortness of breath/dyspnea on exertion/respiratory distress. COMMUNICATION/EDUCATION:   The patient was educated on:  [x]         Early post operative mobility is imperative to achieve a patient's desired outcomes and to restore biological function. [x]         Post operative hip precautions may/may not be applicable. These precautions are based on the patient's physician and the procedure(s) performed. [x]         Anterior hip precautions including:        No hip extension        No external rotation        No crossing of the legs/midline    The patients plan of care was discussed as follows:   [x]         The patient verbalized understanding of his plan in preparation for their upcoming surgery  []         The patient's  was present for this session  [x]        The patient reports that he/she does not have a  identified at this time  []         The  verbalized understanding of the education regarding the patient's upcoming surgery  [x]         Patient/family agree to work toward stated goals and plan of care. []         Patient understands intent and goals of therapy, but is neutral about his/her participation. []         Patient is unable to participate in goal setting and plan of care.       Thank you for this referral.  Christine Gómez, PT   Time Calculation: 19 mins

## 2022-12-21 LAB
ATRIAL RATE: 72 BPM
BACTERIA SPEC CULT: NORMAL
BACTERIA SPEC CULT: NORMAL
CALCULATED P AXIS, ECG09: 42 DEGREES
CALCULATED R AXIS, ECG10: 36 DEGREES
CALCULATED T AXIS, ECG11: 8 DEGREES
DIAGNOSIS, 93000: NORMAL
P-R INTERVAL, ECG05: 130 MS
Q-T INTERVAL, ECG07: 414 MS
QRS DURATION, ECG06: 82 MS
QTC CALCULATION (BEZET), ECG08: 453 MS
SERVICE CMNT-IMP: NORMAL
VENTRICULAR RATE, ECG03: 72 BPM

## 2022-12-21 NOTE — ADVANCED PRACTICE NURSE
PAT Nurse Practitioner   Pre-Operative Chart Review/Assessment:-ORTHOPEDIC/NEUROSURGICAL SPINE                Patient Name:  Juan Carlos Simmons                                                           Age:   71 y.o.    :  1950     Today's Date:  2022     Date of PAT:   22      Date of Surgery:    1/3/2023      Procedure(s):  Left  Total Hip Arthroplasty     Surgeon:   Elis Banks     Medical Clearance:  [address] [facility]                    PLAN:      1)  Cardiac Clearance:  Not requested       2)  Program for Diabetes Health Consult:  Not indicated-A1C 5.0      3)  Sleep Apnea evaluation:   STOP BANG Score 4;  Snoring-denies, Apnea-denies               4) Treatment for MRSA/Staph Aureus:  Negative       5) Additional Concerns:  BMI 42, T2DM, RA, psoriasis, anxiety, BPH, anemia, former smoker. Fall hx.  Lives alone                 Vital Signs:         Visit Vitals  BP (!) 158/81 (BP 1 Location: Left upper arm, BP Patient Position: At rest;Sitting)   Pulse 81   Temp 97.7 °F (36.5 °C)   Resp 20   Ht 5' 7\" (1.702 m)   Wt 121.8 kg (268 lb 8.3 oz)   SpO2 99%   BMI 42.06 kg/m²                        ____________________________________________  PAST MEDICAL HISTORY  Past Medical History:   Diagnosis Date    At risk for sleep apnea     DEVAUGHN 4- was tested years ago- and none    Autoimmune disease ([facility] [address]     rheumatoid arthritis    Bell's palsy     Diabetes ([facility])     Hypertension     Lyme disease     Psoriasis     Psychiatric disorder     anxiety    PUD (peptic ulcer disease)     Staph infection     left arm      ____________________________________________  PAST SURGICAL HISTORY  Past Surgical History:   Procedure Laterality Date    HX HEART CATHETERIZATION      HX ORTHOPAEDIC      left arm repair      ____________________________________________  HOME MEDICATIONS    Current Outpatient Medications   Medication Sig    guaiFENesin ER (Mucinex) 600 mg ER tablet Take 600 mg by mouth every twelve (12) hours as needed for Congestion. tamsulosin (FLOMAX) 0.4 mg capsule Take 0.8 mg by mouth daily. SULFASALAZINE PO Take 1,000 mg by mouth two (2) times a day.    gabapentin (NEURONTIN) 300 mg capsule 600 mg nightly. ibuprofen (MOTRIN) 400 mg tablet TAKE ONE TABLET BY MOUTH THREE TIMES A DAY AS NEEDED FOR PAIN AS DIRECTED    folic acid (FOLVITE) 1 mg tablet Take 3 mg by mouth.    metFORMIN (GLUCOPHAGE) 500 mg tablet Take 500 mg by mouth daily (with breakfast). lisinopriL (PRINIVIL, ZESTRIL) 40 mg tablet TAKE ONE TABLET BY MOUTH ONCE DAILY WILL REPLACE ENALAPRIL. THIS IS FOR BLOOD PRESSURE AND TO PROTECT THE  KIDNEYS    finasteride (PROSCAR) 5 mg tablet Take 5 mg by mouth daily. atorvastatin (LIPITOR) 80 mg tablet Take 80 mg by mouth daily. cholecalciferol, vitamin D3, 50 mcg (2,000 unit) tab Take  by mouth. carvediloL (COREG) 25 mg tablet Take 12.5 mg by mouth two (2) times daily (with meals). aspirin (ASPIRIN) 325 mg tablet Take 325 mg by mouth daily.      No current facility-administered medications for this encounter.      ____________________________________________  ALLERGIES  No Known Allergies   ____________________________________________  SOCIAL HISTORY  Social History     Tobacco Use    Smoking status: Former     Types: Cigarettes     Quit date:      Years since quittin.9    Smokeless tobacco: Never   Substance Use Topics    Alcohol use: Not on file      ____________________________________________  COVID VACCINATION STATUS:      Internal Administration   First Dose      Second Dose         Last COVID Lab No results found for: Bandar [address]er [address], Diane [address], [facility], [address] [facility], [address] [facility], [address] [place] [address], [address], [address] [facility], [address] , [address] Outpatient Visit on 2022   Component Date Value Ref Range Status    INR 2022 1.0  0.9 - 1.1   Final    A single therapeutic range for Vit K antagonists may not be optimal for all indications - see  issue of Chest, American College of Chest Physicians Evidence-Based Clinical Practice Guidelines, 8th Edition. Prothrombin time 12/20/2022 10.8  9.0 - 11.1 sec Final    Color 12/20/2022 DARK YELLOW    Final    Color Reference Range: Straw, Yellow or Dark Yellow    Appearance 12/20/2022 CLEAR  CLEAR   Final    Specific gravity 12/20/2022 1.030    Final    pH (UA) 12/20/2022 5.5  5.0 - 8.0   Final    Protein 12/20/2022 Negative  NEG mg/dL Final    Glucose 12/20/2022 Negative  NEG mg/dL Final    Ketone 12/20/2022 Negative  NEG mg/dL Final    Bilirubin 12/20/2022 Negative  NEG   Final    Blood 12/20/2022 Negative  NEG   Final    Urobilinogen 12/20/2022 0.2  0.2 - 1.0 EU/dL Final    Nitrites 12/20/2022 Negative  NEG   Final    Leukocyte Esterase 12/20/2022 Negative  NEG   Final    UA:UC IF INDICATED 12/20/2022 CULTURE NOT INDICATED BY UA RESULT  CNI   Final    WBC 12/20/2022 0-4  0 - 4 /hpf Final    RBC 12/20/2022 0-5  0 - 5 /hpf Final    Epithelial cells 12/20/2022 FEW  FEW /lpf Final    Epithelial cell category consists of squamous cells and /or transitional urothelial cells. Renal tubular cells, if present, are separately identified as such.     Bacteria 12/20/2022 Negative  NEG /hpf Final    Hyaline cast 12/20/2022 0-2  0 - 2 /lpf Final    Ventricular Rate 12/20/2022 72  BPM Final    Atrial Rate 12/20/2022 72  BPM Final    P-R Interval 12/20/2022 130  ms Final    QRS Duration 12/20/2022 82  ms Final    Q-T Interval 12/20/2022 414  ms Final    QTC Calculation (Bezet) 12/20/2022 453  ms Final    Calculated P Axis 12/20/2022 42  degrees Final    Calculated R Axis 12/20/2022 36  degrees Final    Calculated T Axis 12/20/2022 8  degrees Final    Diagnosis 12/20/2022    Final                    Value:Normal sinus rhythm  No previous ECGs available  Confirmed by Mikki Neal ([id number]) on 12/21/2022 8:07:51 AM      Hemoglobin A1c 12/20/2022 5.0  4.0 - 5.6 % Final    Comment: NEW METHOD  PLEASE NOTE NEW REFERENCE RANGE  (NOTE)  HbA1C Interpretive Ranges  <5.7              Normal  5.7 - 6.4         Consider Prediabetes  >6.5              Consider Diabetes      Est. average glucose 12/20/2022 97  mg/dL Final    Special Requests: 12/20/2022 NO SPECIAL REQUESTS    Final    Culture result: 12/20/2022 MRSA NOT PRESENT    Final    Culture result: 12/20/2022     Final                    Value:Screening of patient nares for MRSA is for surveillance purposes and, if positive, to facilitate isolation considerations in high risk settings. It is not intended for automatic decolonization interventions per se as regimens are not sufficiently effective to warrant routine use. WBC 12/20/2022 8.4  4.1 - 11.1 K/uL Final    RBC 12/20/2022 3.67 (A)  4.10 - 5.70 M/uL Final    HGB 12/20/2022 11.3 (A)  12.1 - 17.0 g/dL Final    HCT 12/20/2022 35.8 (A)  36.6 - 50.3 % Final    MCV 12/20/2022 97.5  80.0 - 99.0 FL Final    MCH 12/20/2022 30.8  26.0 - 34.0 PG Final    MCHC 12/20/2022 31.6  30.0 - 36.5 g/dL Final    RDW 12/20/2022 14.9 (A)  11.5 - 14.5 % Final    PLATELET 17/65/6421 880  150 - 400 K/uL Final    MPV 12/20/2022 10.4  8.9 - 12.9 FL Final    NRBC 12/20/2022 0.0  0  WBC Final    ABSOLUTE NRBC 12/20/2022 0.00  0.00 - 0.01 K/uL Final    NEUTROPHILS 12/20/2022 77 (A)  32 - 75 % Final    LYMPHOCYTES 12/20/2022 12  12 - 49 % Final    MONOCYTES 12/20/2022 9  5 - 13 % Final    EOSINOPHILS 12/20/2022 1  0 - 7 % Final    BASOPHILS 12/20/2022 1  0 - 1 % Final    IMMATURE GRANULOCYTES 12/20/2022 0  0.0 - 0.5 % Final    ABS. NEUTROPHILS 12/20/2022 6.4  1.8 - 8.0 K/UL Final    ABS. LYMPHOCYTES 12/20/2022 1.0  0.8 - 3.5 K/UL Final    ABS. MONOCYTES 12/20/2022 0.8  0.0 - 1.0 K/UL Final    ABS. EOSINOPHILS 12/20/2022 0.1  0.0 - 0.4 K/UL Final    ABS. BASOPHILS 12/20/2022 0.1  0.0 - 0.1 K/UL Final    ABS. IMM. GRANS.  12/20/2022 0.0  0.00 - 0.04 K/UL Final    DF 12/20/2022 AUTOMATED    Final    aPTT 12/20/2022 26.5  22.1 - 31.0 sec Final    In addition to factor deficiency, monitoring heparin therapy, etc., evaluation of a prolonged aPTT result should include consideration of preanalytic variables such as heparin flush contamination, specimen integrity issues, etc.    aPTT, therapeutic range 12/20/2022      58.0 - 77.0 SECS Final    Sed rate, automated 12/20/2022 43 (A)  0 - 20 mm/hr Final    C-Reactive protein 12/20/2022 1.61 (A)  0.00 - 0.60 mg/dL Final    Comment: CRP is a nonspecific acute phase reactant that shows rapid, marked increases with inflammation, infection, trauma, tissue necrosis, malignancies and autoimmune diseases. Sequential CRP levels are useful in monitoring response to antibacterial therapy. This assay is not equivalent to the hsCRP test since the presence of one or more of the foregoing disease processes obviates the risk stratification information available from hsCRP testing. Sodium 12/20/2022 140  136 - 145 mmol/L Final    Potassium 12/20/2022 3.8  3.5 - 5.1 mmol/L Final    Chloride 12/20/2022 105  97 - 108 mmol/L Final    CO2 12/20/2022 28  21 - 32 mmol/L Final    Anion gap 12/20/2022 7  5 - 15 mmol/L Final    Glucose 12/20/2022 93  65 - 100 mg/dL Final    BUN 12/20/2022 18  6 - 20 MG/DL Final    Creatinine 12/20/2022 0.75  0.70 - 1.30 MG/DL Final    BUN/Creatinine ratio 12/20/2022 24 (A)  12 - 20   Final    eGFR 12/20/2022 >60  >60 ml/min/1.73m2 Final    Comment:      Pediatric calculator link: Wrnch.at. org/professionals/kdoqi/gfr_calculatorped       These results are not intended for use in patients <25years of age. eGFR results are calculated without a race factor using  the 2021 CKD-EPI equation. Careful clinical correlation is recommended, particularly when comparing to results calculated using previous equations.   The CKD-EPI equation is less accurate in patients with extremes of muscle mass, extra-renal metabolism of creatinine, excessive creatine ingestion, or following therapy that affects renal tubular secretion. Calcium 12/20/2022 9.4  8.5 - 10.1 MG/DL Final    Bilirubin, total 12/20/2022 0.6  0.2 - 1.0 MG/DL Final    ALT (SGPT) 12/20/2022 22  12 - 78 U/L Final    AST (SGOT) 12/20/2022 12 (A)  15 - 37 U/L Final    Alk. phosphatase 12/20/2022 84  45 - 117 U/L Final    Protein, total 12/20/2022 7.9  6.4 - 8.2 g/dL Final    Albumin 12/20/2022 3.7  3.5 - 5.0 g/dL Final    Globulin 12/20/2022 4.2 (A)  2.0 - 4.0 g/dL Final    A-G Ratio 12/20/2022 0.9 (A)  1.1 - 2.2   Final    Crossmatch Expiration 12/20/2022 01/03/2023,2359   Final    ABO/Rh(D) 12/20/2022 A POSITIVE   Final    Antibody screen 12/20/2022 NEG   Final    SAMPLES BEING HELD 12/20/2022 SST   Final    COMMENT 12/20/2022 Add-on orders for these samples will be processed based on acceptable specimen integrity and analyte stability, which may vary by analyte. Final        XR Results (most recent):    Results from Hospital Encounter encounter on 12/03/22    XR HIP LT W OR WO PELV 2-3 VWS    Narrative  EXAM: XR HIP LT W OR WO PELV 2-3 VWS    INDICATION: fall, left hip[ pain. COMPARISON: None. FINDINGS: AP view of the pelvis and a frogleg lateral view of the left hip  demonstrate severe left hip osteoarthritis with flattening of the superior  femoral head articular surface, may reflect an age indeterminate subchondral  collapse. No malalignment. Vascular calcifications. Impression  severe left hip osteoarthritis with flattening of the superior femoral head  articular surface, may reflect an age indeterminate subchondral collapse. Skin:   Denies open wounds, cuts, sores, rashes or other areas of concern in PAT assessment.         Magi Perry, PARISA

## 2022-12-23 NOTE — PERIOP NOTES
Called Dr. Emelina Nicholson office and spoke to Ivanna Jules covering for his nurse Saima Hua, in regards to how long pt should hold aspirin, ibuprofen, and sulfasalazine medication. Pt to hold medications 5 days prior to surgery per their office's instructions. LVM to make pt aware and told to CB to make sure he received message. Patient called back and verbalized understanding.

## 2022-12-28 DIAGNOSIS — M16.12 ARTHRITIS OF LEFT HIP: Primary | ICD-10-CM

## 2023-01-04 ENCOUNTER — HOSPITAL ENCOUNTER (OUTPATIENT)
Dept: GENERAL RADIOLOGY | Age: 73
Discharge: HOME | End: 2023-01-04
Attending: ORTHOPAEDIC SURGERY
Payer: MEDICARE

## 2023-01-04 DIAGNOSIS — M16.12 ARTHRITIS OF LEFT HIP: ICD-10-CM

## 2023-01-04 PROCEDURE — 74011000250 HC RX REV CODE- 250: Performed by: STUDENT IN AN ORGANIZED HEALTH CARE EDUCATION/TRAINING PROGRAM

## 2023-01-04 PROCEDURE — 77030014113 XR INJ ASP LARGE JOINT / BURSA

## 2023-01-04 RX ORDER — LIDOCAINE HYDROCHLORIDE 10 MG/ML
20 INJECTION INFILTRATION; PERINEURAL
Status: COMPLETED | OUTPATIENT
Start: 2023-01-04 | End: 2023-01-04

## 2023-01-04 RX ADMIN — LIDOCAINE HYDROCHLORIDE 20 ML: 10 INJECTION, SOLUTION INFILTRATION; PERINEURAL at 11:00

## 2023-01-13 DIAGNOSIS — M16.12 ARTHRITIS OF LEFT HIP: Primary | ICD-10-CM

## 2023-01-16 NOTE — PERIOP NOTES
Spoke to Charbel Winkler in Dr. Arabella Mcneal office regarding clearance from the [facility].  She stated  she faxed it over when surgery was scheduled on 1/3/23 but we did not receive it. She states Dr. Celso Bailey is aware and still wants to proceed with planned procedure.

## 2023-01-16 NOTE — PERIOP NOTES
Phone call to patient- was rescheduled Because \"need to pull fluid off of my hip\" (aspiration) - pt verbalizes he has been doing his preop showering and has his presurgery drink (reminded to drink half tonight and half in morning).  Phone call to Dr Codey Boone office to inquire about preop clearance- LVM  Pt reports he has stopped his aspirin, ibuprofen and Sulfasalazine 5 days ago  Called and left message for Brissa Jett at Dr Codey Boone office to obtain copy of preop clearance form

## 2023-01-17 ENCOUNTER — ANESTHESIA EVENT (OUTPATIENT)
Dept: SURGERY | Age: 73
End: 2023-01-17
Payer: MEDICARE

## 2023-01-17 ENCOUNTER — APPOINTMENT (OUTPATIENT)
Dept: GENERAL RADIOLOGY | Age: 73
End: 2023-01-17
Attending: PHYSICIAN ASSISTANT
Payer: MEDICARE

## 2023-01-17 ENCOUNTER — APPOINTMENT (OUTPATIENT)
Dept: GENERAL RADIOLOGY | Age: 73
End: 2023-01-17
Attending: ORTHOPAEDIC SURGERY
Payer: MEDICARE

## 2023-01-17 ENCOUNTER — ANESTHESIA (OUTPATIENT)
Dept: SURGERY | Age: 73
End: 2023-01-17
Payer: MEDICARE

## 2023-01-17 ENCOUNTER — HOSPITAL ENCOUNTER (OUTPATIENT)
Age: 73
Discharge: HOME | End: 2023-01-17
Attending: ORTHOPAEDIC SURGERY | Admitting: ORTHOPAEDIC SURGERY
Payer: MEDICARE

## 2023-01-17 VITALS
BODY MASS INDEX: 37.91 KG/M2 | WEIGHT: 255.95 LBS | HEIGHT: 69 IN | HEART RATE: 99 BPM | OXYGEN SATURATION: 94 % | DIASTOLIC BLOOD PRESSURE: 82 MMHG | TEMPERATURE: 98.2 F | SYSTOLIC BLOOD PRESSURE: 162 MMHG | RESPIRATION RATE: 19 BRPM

## 2023-01-17 DIAGNOSIS — M16.12 OSTEOARTHRITIS OF LEFT HIP, UNSPECIFIED OSTEOARTHRITIS TYPE: Primary | ICD-10-CM

## 2023-01-17 DIAGNOSIS — Z96.642 STATUS POST TOTAL REPLACEMENT OF LEFT HIP: ICD-10-CM

## 2023-01-17 LAB
ABO + RH BLD: NORMAL
BLOOD GROUP ANTIBODIES SERPL: NORMAL
GLUCOSE BLD STRIP.AUTO-MCNC: 131 MG/DL (ref 65–117)
GLUCOSE BLD STRIP.AUTO-MCNC: 97 MG/DL (ref 65–117)
SERVICE CMNT-IMP: ABNORMAL
SERVICE CMNT-IMP: NORMAL
SPECIMEN EXP DATE BLD: NORMAL

## 2023-01-17 PROCEDURE — 97165 OT EVAL LOW COMPLEX 30 MIN: CPT | Performed by: OCCUPATIONAL THERAPIST

## 2023-01-17 PROCEDURE — 74011000272 HC RX REV CODE- 272: Performed by: ORTHOPAEDIC SURGERY

## 2023-01-17 PROCEDURE — 74011000250 HC RX REV CODE- 250: Performed by: PHYSICIAN ASSISTANT

## 2023-01-17 PROCEDURE — 74011000250 HC RX REV CODE- 250: Performed by: ORTHOPAEDIC SURGERY

## 2023-01-17 PROCEDURE — 74011250636 HC RX REV CODE- 250/636: Performed by: ANESTHESIOLOGY

## 2023-01-17 PROCEDURE — 76210000020 HC REC RM PH II FIRST 0.5 HR: Performed by: ORTHOPAEDIC SURGERY

## 2023-01-17 PROCEDURE — 74011000250 HC RX REV CODE- 250: Performed by: ANESTHESIOLOGY

## 2023-01-17 PROCEDURE — 27130 TOTAL HIP ARTHROPLASTY: CPT | Performed by: ORTHOPAEDIC SURGERY

## 2023-01-17 PROCEDURE — 73501 X-RAY EXAM HIP UNI 1 VIEW: CPT

## 2023-01-17 PROCEDURE — 97163 PT EVAL HIGH COMPLEX 45 MIN: CPT

## 2023-01-17 PROCEDURE — 20985 CPTR-ASST DIR MS PX: CPT | Performed by: ORTHOPAEDIC SURGERY

## 2023-01-17 PROCEDURE — C1776 JOINT DEVICE (IMPLANTABLE): HCPCS | Performed by: ORTHOPAEDIC SURGERY

## 2023-01-17 PROCEDURE — 2709999900 HC NON-CHARGEABLE SUPPLY: Performed by: ORTHOPAEDIC SURGERY

## 2023-01-17 PROCEDURE — 77030013079 HC BLNKT BAIR HGGR 3M -A: Performed by: ANESTHESIOLOGY

## 2023-01-17 PROCEDURE — 97535 SELF CARE MNGMENT TRAINING: CPT | Performed by: OCCUPATIONAL THERAPIST

## 2023-01-17 PROCEDURE — 97530 THERAPEUTIC ACTIVITIES: CPT

## 2023-01-17 PROCEDURE — 77030039825 HC MSK NSL PAP SUPERNO2VA VYRM -B: Performed by: ANESTHESIOLOGY

## 2023-01-17 PROCEDURE — 36415 COLL VENOUS BLD VENIPUNCTURE: CPT

## 2023-01-17 PROCEDURE — 76060000034 HC ANESTHESIA 1.5 TO 2 HR: Performed by: ORTHOPAEDIC SURGERY

## 2023-01-17 PROCEDURE — 77030035236 HC SUT PDS STRATFX BARB J&J -B: Performed by: ORTHOPAEDIC SURGERY

## 2023-01-17 PROCEDURE — 74011000250 HC RX REV CODE- 250

## 2023-01-17 PROCEDURE — 74011250636 HC RX REV CODE- 250/636: Performed by: PHYSICIAN ASSISTANT

## 2023-01-17 PROCEDURE — 77030006812 HC BLD SAW RECIP STRY -B: Performed by: ORTHOPAEDIC SURGERY

## 2023-01-17 PROCEDURE — 76010000162 HC OR TIME 1.5 TO 2 HR INTENSV-TIER 1: Performed by: ORTHOPAEDIC SURGERY

## 2023-01-17 PROCEDURE — 76000 FLUOROSCOPY <1 HR PHYS/QHP: CPT

## 2023-01-17 PROCEDURE — 74011250636 HC RX REV CODE- 250/636

## 2023-01-17 PROCEDURE — 77030020788: Performed by: ORTHOPAEDIC SURGERY

## 2023-01-17 PROCEDURE — 77030007866 HC KT SPN ANES BBMI -B: Performed by: ANESTHESIOLOGY

## 2023-01-17 PROCEDURE — 74011250636 HC RX REV CODE- 250/636: Performed by: NURSE ANESTHETIST, CERTIFIED REGISTERED

## 2023-01-17 PROCEDURE — 82962 GLUCOSE BLOOD TEST: CPT

## 2023-01-17 PROCEDURE — 77030036660

## 2023-01-17 PROCEDURE — 27130 TOTAL HIP ARTHROPLASTY: CPT | Performed by: PHYSICIAN ASSISTANT

## 2023-01-17 PROCEDURE — 77030002933 HC SUT MCRYL J&J -A: Performed by: ORTHOPAEDIC SURGERY

## 2023-01-17 PROCEDURE — 77030031139 HC SUT VCRL2 J&J -A: Performed by: ORTHOPAEDIC SURGERY

## 2023-01-17 PROCEDURE — 97116 GAIT TRAINING THERAPY: CPT

## 2023-01-17 PROCEDURE — 72170 X-RAY EXAM OF PELVIS: CPT

## 2023-01-17 PROCEDURE — 86900 BLOOD TYPING SEROLOGIC ABO: CPT

## 2023-01-17 PROCEDURE — 74011000258 HC RX REV CODE- 258: Performed by: PHYSICIAN ASSISTANT

## 2023-01-17 PROCEDURE — 74011250637 HC RX REV CODE- 250/637: Performed by: ORTHOPAEDIC SURGERY

## 2023-01-17 PROCEDURE — 77030011264 HC ELECTRD BLD EXT COVD -A: Performed by: ORTHOPAEDIC SURGERY

## 2023-01-17 PROCEDURE — 74011250637 HC RX REV CODE- 250/637: Performed by: PHYSICIAN ASSISTANT

## 2023-01-17 PROCEDURE — 77030018547 HC SUT ETHBND1 J&J -B: Performed by: ORTHOPAEDIC SURGERY

## 2023-01-17 PROCEDURE — 77030010507 HC ADH SKN DERMBND J&J -B: Performed by: ORTHOPAEDIC SURGERY

## 2023-01-17 PROCEDURE — 76210000007 HC OR PH I REC 5.5 TO 6 HR: Performed by: ORTHOPAEDIC SURGERY

## 2023-01-17 DEVICE — PINNACLE CANCELLOUS BONE SCREW 6.5MM X 25MM
Type: IMPLANTABLE DEVICE | Site: HIP | Status: FUNCTIONAL
Brand: PINNACLE

## 2023-01-17 DEVICE — PINNACLE HIP SOLUTIONS ALTRX POLYETHYLENE ACETABULAR LINER NEUTRAL 36MM ID 56MM OD
Type: IMPLANTABLE DEVICE | Site: HIP | Status: FUNCTIONAL
Brand: PINNACLE ALTRX

## 2023-01-17 DEVICE — ACTIS DUOFIX HIP PROSTHESIS (FEMORAL STEM 12/14 TAPER CEMENTLESS SIZE 6 STD COLLAR)  CE
Type: IMPLANTABLE DEVICE | Site: HIP | Status: FUNCTIONAL
Brand: ACTIS

## 2023-01-17 DEVICE — HIP H2 TOT ADV OTHER HD IMPL CAPPED SYNTHES: Type: IMPLANTABLE DEVICE | Status: FUNCTIONAL

## 2023-01-17 DEVICE — PINNACLE CANCELLOUS BONE SCREW 6.5MM X 35MM
Type: IMPLANTABLE DEVICE | Site: HIP | Status: FUNCTIONAL
Brand: PINNACLE

## 2023-01-17 DEVICE — BIOLOX DELTA CERAMIC FEMORAL HEAD +8.5 36MM DIA 12/14 TAPER
Type: IMPLANTABLE DEVICE | Site: HIP | Status: FUNCTIONAL
Brand: BIOLOX DELTA

## 2023-01-17 DEVICE — PINNACLE GRIPTION ACETABULAR SHELL SECTOR 56MM OD
Type: IMPLANTABLE DEVICE | Site: HIP | Status: FUNCTIONAL
Brand: PINNACLE GRIPTION

## 2023-01-17 RX ORDER — PROPOFOL 10 MG/ML
INJECTION, EMULSION INTRAVENOUS AS NEEDED
Status: DISCONTINUED | OUTPATIENT
Start: 2023-01-17 | End: 2023-01-17 | Stop reason: HOSPADM

## 2023-01-17 RX ORDER — SODIUM CHLORIDE 0.9 % (FLUSH) 0.9 %
5-40 SYRINGE (ML) INJECTION AS NEEDED
Status: DISCONTINUED | OUTPATIENT
Start: 2023-01-17 | End: 2023-01-17 | Stop reason: HOSPADM

## 2023-01-17 RX ORDER — FACIAL-BODY WIPES
10 EACH TOPICAL DAILY PRN
Status: DISCONTINUED | OUTPATIENT
Start: 2023-01-18 | End: 2023-01-17 | Stop reason: HOSPADM

## 2023-01-17 RX ORDER — ASPIRIN 81 MG/1
81 TABLET ORAL
Qty: 30 TABLET | Refills: 0 | Status: SHIPPED | OUTPATIENT
Start: 2023-01-17 | End: 2023-02-16

## 2023-01-17 RX ORDER — DEXAMETHASONE SODIUM PHOSPHATE 4 MG/ML
10 INJECTION, SOLUTION INTRA-ARTICULAR; INTRALESIONAL; INTRAMUSCULAR; INTRAVENOUS; SOFT TISSUE ONCE
Status: COMPLETED | OUTPATIENT
Start: 2023-01-17 | End: 2023-01-17

## 2023-01-17 RX ORDER — PREGABALIN 75 MG/1
75 CAPSULE ORAL ONCE
Status: COMPLETED | OUTPATIENT
Start: 2023-01-17 | End: 2023-01-17

## 2023-01-17 RX ORDER — TRAMADOL HYDROCHLORIDE 50 MG/1
TABLET ORAL
Status: DISCONTINUED
Start: 2023-01-17 | End: 2023-01-17 | Stop reason: HOSPADM

## 2023-01-17 RX ORDER — NALOXONE HYDROCHLORIDE 0.4 MG/ML
0.4 INJECTION, SOLUTION INTRAMUSCULAR; INTRAVENOUS; SUBCUTANEOUS AS NEEDED
Status: DISCONTINUED | OUTPATIENT
Start: 2023-01-17 | End: 2023-01-17 | Stop reason: HOSPADM

## 2023-01-17 RX ORDER — OXYCODONE HYDROCHLORIDE 5 MG/1
5 TABLET ORAL
Status: DISCONTINUED | OUTPATIENT
Start: 2023-01-17 | End: 2023-01-17 | Stop reason: HOSPADM

## 2023-01-17 RX ORDER — EPHEDRINE SULFATE/0.9% NACL/PF 50 MG/5 ML
SYRINGE (ML) INTRAVENOUS AS NEEDED
Status: DISCONTINUED | OUTPATIENT
Start: 2023-01-17 | End: 2023-01-17 | Stop reason: HOSPADM

## 2023-01-17 RX ORDER — FINASTERIDE 5 MG/1
5 TABLET, FILM COATED ORAL DAILY
Status: DISCONTINUED | OUTPATIENT
Start: 2023-01-18 | End: 2023-01-17 | Stop reason: HOSPADM

## 2023-01-17 RX ORDER — KETOROLAC TROMETHAMINE 30 MG/ML
15 INJECTION, SOLUTION INTRAMUSCULAR; INTRAVENOUS EVERY 6 HOURS
Status: DISCONTINUED | OUTPATIENT
Start: 2023-01-17 | End: 2023-01-17 | Stop reason: HOSPADM

## 2023-01-17 RX ORDER — ACETAMINOPHEN 325 MG/1
TABLET ORAL
Status: DISCONTINUED
Start: 2023-01-17 | End: 2023-01-17 | Stop reason: HOSPADM

## 2023-01-17 RX ORDER — BUPIVACAINE HYDROCHLORIDE AND EPINEPHRINE 5; 5 MG/ML; UG/ML
INJECTION, SOLUTION PERINEURAL AS NEEDED
Status: DISCONTINUED | OUTPATIENT
Start: 2023-01-17 | End: 2023-01-17 | Stop reason: HOSPADM

## 2023-01-17 RX ORDER — PROPOFOL 10 MG/ML
INJECTION, EMULSION INTRAVENOUS
Status: DISCONTINUED | OUTPATIENT
Start: 2023-01-17 | End: 2023-01-17 | Stop reason: HOSPADM

## 2023-01-17 RX ORDER — ONDANSETRON 2 MG/ML
4 INJECTION INTRAMUSCULAR; INTRAVENOUS
Status: DISCONTINUED | OUTPATIENT
Start: 2023-01-17 | End: 2023-01-17 | Stop reason: HOSPADM

## 2023-01-17 RX ORDER — DEXMEDETOMIDINE HYDROCHLORIDE 100 UG/ML
INJECTION, SOLUTION INTRAVENOUS AS NEEDED
Status: DISCONTINUED | OUTPATIENT
Start: 2023-01-17 | End: 2023-01-17 | Stop reason: HOSPADM

## 2023-01-17 RX ORDER — DIPHENHYDRAMINE HYDROCHLORIDE 50 MG/ML
12.5 INJECTION, SOLUTION INTRAMUSCULAR; INTRAVENOUS AS NEEDED
Status: DISCONTINUED | OUTPATIENT
Start: 2023-01-17 | End: 2023-01-17 | Stop reason: HOSPADM

## 2023-01-17 RX ORDER — GLYCOPYRROLATE 0.2 MG/ML
INJECTION INTRAMUSCULAR; INTRAVENOUS AS NEEDED
Status: DISCONTINUED | OUTPATIENT
Start: 2023-01-17 | End: 2023-01-17 | Stop reason: HOSPADM

## 2023-01-17 RX ORDER — HYDROMORPHONE HYDROCHLORIDE 1 MG/ML
0.5 INJECTION, SOLUTION INTRAMUSCULAR; INTRAVENOUS; SUBCUTANEOUS
Status: DISCONTINUED | OUTPATIENT
Start: 2023-01-17 | End: 2023-01-17 | Stop reason: HOSPADM

## 2023-01-17 RX ORDER — HYDROMORPHONE HYDROCHLORIDE 1 MG/ML
.2-.5 INJECTION, SOLUTION INTRAMUSCULAR; INTRAVENOUS; SUBCUTANEOUS
Status: DISCONTINUED | OUTPATIENT
Start: 2023-01-17 | End: 2023-01-17 | Stop reason: HOSPADM

## 2023-01-17 RX ORDER — SODIUM CHLORIDE 0.9 % (FLUSH) 0.9 %
5-40 SYRINGE (ML) INJECTION EVERY 8 HOURS
Status: DISCONTINUED | OUTPATIENT
Start: 2023-01-17 | End: 2023-01-17 | Stop reason: HOSPADM

## 2023-01-17 RX ORDER — LISINOPRIL 40 MG/1
40 TABLET ORAL DAILY
Status: DISCONTINUED | OUTPATIENT
Start: 2023-01-18 | End: 2023-01-17 | Stop reason: HOSPADM

## 2023-01-17 RX ORDER — LIDOCAINE HYDROCHLORIDE 20 MG/ML
INJECTION, SOLUTION EPIDURAL; INFILTRATION; INTRACAUDAL; PERINEURAL AS NEEDED
Status: DISCONTINUED | OUTPATIENT
Start: 2023-01-17 | End: 2023-01-17 | Stop reason: HOSPADM

## 2023-01-17 RX ORDER — MIDAZOLAM HYDROCHLORIDE 1 MG/ML
INJECTION, SOLUTION INTRAMUSCULAR; INTRAVENOUS AS NEEDED
Status: DISCONTINUED | OUTPATIENT
Start: 2023-01-17 | End: 2023-01-17 | Stop reason: HOSPADM

## 2023-01-17 RX ORDER — ACETAMINOPHEN 325 MG/1
650 TABLET ORAL EVERY 6 HOURS
Qty: 56 TABLET | Refills: 0 | Status: SHIPPED | OUTPATIENT
Start: 2023-01-17 | End: 2023-01-24

## 2023-01-17 RX ORDER — BUPIVACAINE HYDROCHLORIDE 7.5 MG/ML
INJECTION, SOLUTION EPIDURAL; RETROBULBAR AS NEEDED
Status: DISCONTINUED | OUTPATIENT
Start: 2023-01-17 | End: 2023-01-17 | Stop reason: HOSPADM

## 2023-01-17 RX ORDER — POLYETHYLENE GLYCOL 3350 17 G/17G
17 POWDER, FOR SOLUTION ORAL DAILY
Qty: 7 PACKET | Refills: 0 | Status: SHIPPED | OUTPATIENT
Start: 2023-01-18 | End: 2023-01-25

## 2023-01-17 RX ORDER — CARVEDILOL 12.5 MG/1
12.5 TABLET ORAL 2 TIMES DAILY WITH MEALS
Status: DISCONTINUED | OUTPATIENT
Start: 2023-01-17 | End: 2023-01-17 | Stop reason: HOSPADM

## 2023-01-17 RX ORDER — ACETAMINOPHEN 500 MG
1000 TABLET ORAL ONCE
Status: COMPLETED | OUTPATIENT
Start: 2023-01-17 | End: 2023-01-17

## 2023-01-17 RX ORDER — MORPHINE SULFATE 4 MG/ML
2 INJECTION INTRAVENOUS
Status: DISCONTINUED | OUTPATIENT
Start: 2023-01-17 | End: 2023-01-17 | Stop reason: HOSPADM

## 2023-01-17 RX ORDER — ACETAMINOPHEN 325 MG/1
650 TABLET ORAL EVERY 6 HOURS
Status: DISCONTINUED | OUTPATIENT
Start: 2023-01-17 | End: 2023-01-17 | Stop reason: HOSPADM

## 2023-01-17 RX ORDER — METFORMIN HYDROCHLORIDE 500 MG/1
500 TABLET ORAL
Status: DISCONTINUED | OUTPATIENT
Start: 2023-01-18 | End: 2023-01-17 | Stop reason: HOSPADM

## 2023-01-17 RX ORDER — ASPIRIN 81 MG/1
81 TABLET ORAL 2 TIMES DAILY
Status: DISCONTINUED | OUTPATIENT
Start: 2023-01-17 | End: 2023-01-17 | Stop reason: HOSPADM

## 2023-01-17 RX ORDER — GABAPENTIN 300 MG/1
600 CAPSULE ORAL
Status: DISCONTINUED | OUTPATIENT
Start: 2023-01-17 | End: 2023-01-17 | Stop reason: HOSPADM

## 2023-01-17 RX ORDER — CELECOXIB 200 MG/1
200 CAPSULE ORAL ONCE
Status: COMPLETED | OUTPATIENT
Start: 2023-01-17 | End: 2023-01-17

## 2023-01-17 RX ORDER — ONDANSETRON 2 MG/ML
INJECTION INTRAMUSCULAR; INTRAVENOUS AS NEEDED
Status: DISCONTINUED | OUTPATIENT
Start: 2023-01-17 | End: 2023-01-17 | Stop reason: HOSPADM

## 2023-01-17 RX ORDER — TAMSULOSIN HYDROCHLORIDE 0.4 MG/1
0.8 CAPSULE ORAL DAILY
Status: DISCONTINUED | OUTPATIENT
Start: 2023-01-18 | End: 2023-01-17 | Stop reason: HOSPADM

## 2023-01-17 RX ORDER — FAMOTIDINE 20 MG/1
20 TABLET, FILM COATED ORAL 2 TIMES DAILY
Status: DISCONTINUED | OUTPATIENT
Start: 2023-01-17 | End: 2023-01-17 | Stop reason: HOSPADM

## 2023-01-17 RX ORDER — ATORVASTATIN CALCIUM 40 MG/1
80 TABLET, FILM COATED ORAL DAILY
Status: DISCONTINUED | OUTPATIENT
Start: 2023-01-18 | End: 2023-01-17 | Stop reason: HOSPADM

## 2023-01-17 RX ORDER — TRANEXAMIC ACID 100 MG/ML
INJECTION, SOLUTION INTRAVENOUS AS NEEDED
Status: DISCONTINUED | OUTPATIENT
Start: 2023-01-17 | End: 2023-01-17 | Stop reason: HOSPADM

## 2023-01-17 RX ORDER — AMOXICILLIN 250 MG
1 CAPSULE ORAL 2 TIMES DAILY
Status: DISCONTINUED | OUTPATIENT
Start: 2023-01-17 | End: 2023-01-17 | Stop reason: HOSPADM

## 2023-01-17 RX ORDER — TRAMADOL HYDROCHLORIDE 50 MG/1
50-100 TABLET ORAL
Qty: 30 TABLET | Refills: 0 | Status: SHIPPED | OUTPATIENT
Start: 2023-01-17 | End: 2023-01-24

## 2023-01-17 RX ORDER — TRAMADOL HYDROCHLORIDE 50 MG/1
50 TABLET ORAL
Status: DISCONTINUED | OUTPATIENT
Start: 2023-01-17 | End: 2023-01-17 | Stop reason: HOSPADM

## 2023-01-17 RX ORDER — ONDANSETRON 2 MG/ML
4 INJECTION INTRAMUSCULAR; INTRAVENOUS AS NEEDED
Status: DISCONTINUED | OUTPATIENT
Start: 2023-01-17 | End: 2023-01-17 | Stop reason: HOSPADM

## 2023-01-17 RX ORDER — HYDROXYZINE HYDROCHLORIDE 10 MG/1
10 TABLET, FILM COATED ORAL
Status: DISCONTINUED | OUTPATIENT
Start: 2023-01-17 | End: 2023-01-17 | Stop reason: HOSPADM

## 2023-01-17 RX ORDER — SODIUM CHLORIDE 9 MG/ML
125 INJECTION, SOLUTION INTRAVENOUS CONTINUOUS
Status: DISCONTINUED | OUTPATIENT
Start: 2023-01-17 | End: 2023-01-17 | Stop reason: HOSPADM

## 2023-01-17 RX ORDER — PHENYLEPHRINE HCL IN 0.9% NACL 0.4MG/10ML
SYRINGE (ML) INTRAVENOUS AS NEEDED
Status: DISCONTINUED | OUTPATIENT
Start: 2023-01-17 | End: 2023-01-17 | Stop reason: HOSPADM

## 2023-01-17 RX ORDER — FAMOTIDINE 20 MG/1
20 TABLET, FILM COATED ORAL 2 TIMES DAILY
Qty: 60 TABLET | Refills: 0 | Status: SHIPPED | OUTPATIENT
Start: 2023-01-17 | End: 2023-02-16

## 2023-01-17 RX ORDER — FENTANYL CITRATE 50 UG/ML
25 INJECTION, SOLUTION INTRAMUSCULAR; INTRAVENOUS
Status: DISCONTINUED | OUTPATIENT
Start: 2023-01-17 | End: 2023-01-17 | Stop reason: HOSPADM

## 2023-01-17 RX ORDER — SODIUM CHLORIDE, SODIUM LACTATE, POTASSIUM CHLORIDE, CALCIUM CHLORIDE 600; 310; 30; 20 MG/100ML; MG/100ML; MG/100ML; MG/100ML
25 INJECTION, SOLUTION INTRAVENOUS CONTINUOUS
Status: DISCONTINUED | OUTPATIENT
Start: 2023-01-17 | End: 2023-01-17 | Stop reason: HOSPADM

## 2023-01-17 RX ORDER — AMOXICILLIN 250 MG
1 CAPSULE ORAL 2 TIMES DAILY
Qty: 14 TABLET | Refills: 0 | Status: SHIPPED | OUTPATIENT
Start: 2023-01-17 | End: 2023-01-24

## 2023-01-17 RX ORDER — LIDOCAINE HYDROCHLORIDE 10 MG/ML
0.1 INJECTION, SOLUTION EPIDURAL; INFILTRATION; INTRACAUDAL; PERINEURAL AS NEEDED
Status: DISCONTINUED | OUTPATIENT
Start: 2023-01-17 | End: 2023-01-17 | Stop reason: HOSPADM

## 2023-01-17 RX ORDER — POLYETHYLENE GLYCOL 3350 17 G/17G
17 POWDER, FOR SOLUTION ORAL DAILY
Status: DISCONTINUED | OUTPATIENT
Start: 2023-01-18 | End: 2023-01-17 | Stop reason: HOSPADM

## 2023-01-17 RX ADMIN — PROPOFOL 30 MG: 10 INJECTION, EMULSION INTRAVENOUS at 09:45

## 2023-01-17 RX ADMIN — LIDOCAINE HYDROCHLORIDE 50 MG: 20 INJECTION, SOLUTION EPIDURAL; INFILTRATION; INTRACAUDAL; PERINEURAL at 09:35

## 2023-01-17 RX ADMIN — LIDOCAINE HYDROCHLORIDE 50 MG: 20 INJECTION, SOLUTION EPIDURAL; INFILTRATION; INTRACAUDAL; PERINEURAL at 09:30

## 2023-01-17 RX ADMIN — PREGABALIN 75 MG: 75 CAPSULE ORAL at 08:36

## 2023-01-17 RX ADMIN — Medication 120 MCG: at 10:10

## 2023-01-17 RX ADMIN — WATER 2 G: 1 INJECTION INTRAMUSCULAR; INTRAVENOUS; SUBCUTANEOUS at 09:25

## 2023-01-17 RX ADMIN — Medication 120 MCG: at 10:57

## 2023-01-17 RX ADMIN — GLYCOPYRROLATE 0.2 MG: 0.2 INJECTION, SOLUTION INTRAMUSCULAR; INTRAVENOUS at 09:37

## 2023-01-17 RX ADMIN — TRAMADOL HYDROCHLORIDE 50 MG: 50 TABLET ORAL at 16:40

## 2023-01-17 RX ADMIN — Medication 1000 MG: at 08:36

## 2023-01-17 RX ADMIN — BUPIVACAINE HYDROCHLORIDE 1.4 ML: 7.5 INJECTION, SOLUTION EPIDURAL; RETROBULBAR at 09:07

## 2023-01-17 RX ADMIN — ONDANSETRON HYDROCHLORIDE 4 MG: 2 INJECTION, SOLUTION INTRAMUSCULAR; INTRAVENOUS at 10:51

## 2023-01-17 RX ADMIN — DEXMEDETOMIDINE HYDROCHLORIDE 5 MCG: 100 INJECTION, SOLUTION, CONCENTRATE INTRAVENOUS at 09:34

## 2023-01-17 RX ADMIN — MIDAZOLAM HYDROCHLORIDE 2 MG: 1 INJECTION, SOLUTION INTRAMUSCULAR; INTRAVENOUS at 09:00

## 2023-01-17 RX ADMIN — DEXAMETHASONE SODIUM PHOSPHATE 10 MG: 4 INJECTION, SOLUTION INTRAMUSCULAR; INTRAVENOUS at 10:03

## 2023-01-17 RX ADMIN — Medication 10 MG: at 10:07

## 2023-01-17 RX ADMIN — TRANEXAMIC ACID 1 G: 100 INJECTION, SOLUTION INTRAVENOUS at 09:38

## 2023-01-17 RX ADMIN — WATER 2 G: 1 INJECTION INTRAMUSCULAR; INTRAVENOUS; SUBCUTANEOUS at 16:18

## 2023-01-17 RX ADMIN — ACETAMINOPHEN 650 MG: 325 TABLET ORAL at 13:06

## 2023-01-17 RX ADMIN — SODIUM CHLORIDE, POTASSIUM CHLORIDE, SODIUM LACTATE AND CALCIUM CHLORIDE 25 ML/HR: 600; 310; 30; 20 INJECTION, SOLUTION INTRAVENOUS at 08:36

## 2023-01-17 RX ADMIN — SODIUM CHLORIDE 125 ML/HR: 9 INJECTION, SOLUTION INTRAVENOUS at 11:48

## 2023-01-17 RX ADMIN — Medication 10 MG: at 10:45

## 2023-01-17 RX ADMIN — Medication 3 AMPULE: at 09:09

## 2023-01-17 RX ADMIN — Medication 80 MCG: at 09:29

## 2023-01-17 RX ADMIN — DEXMEDETOMIDINE HYDROCHLORIDE 5 MCG: 100 INJECTION, SOLUTION, CONCENTRATE INTRAVENOUS at 09:37

## 2023-01-17 RX ADMIN — SODIUM CHLORIDE 40 MCG/MIN: 900 INJECTION, SOLUTION INTRAVENOUS at 09:29

## 2023-01-17 RX ADMIN — CELECOXIB 200 MG: 200 CAPSULE ORAL at 08:36

## 2023-01-17 RX ADMIN — PROPOFOL 50 MCG/KG/MIN: 10 INJECTION, EMULSION INTRAVENOUS at 09:30

## 2023-01-17 NOTE — DISCHARGE SUMMARY
Ortho Discharge Summary    Patient ID:  Sergio Fuller  [id number]  male  71 y.o.  1950    Admit date: 1/17/2023    Discharge date: 1/17/2023    Admitting Physician: Jonelle Ross MD     Consulting Physician(s):   Treatment Team: Attending Provider: Uriah Childress MD; Physical Therapist: Steven Pickard PT; Occupational Therapist: Yuli Almodovar OTR/ALETHA; Utilization Review: Ember Loredo RN    Date of Surgery:   1/17/2023     Preoperative Diagnosis:  Primary osteoarthritis of left hip [M16.12]    Postoperative Diagnosis:   Primary osteoarthritis of left hip [M16.12]    Procedure(s):   LEFT TOTAL HIP ARTHROPLASTY ANTERIOR APPROACH     Anesthesia Type:   Spinal     Surgeon: Uriah Childress MD                            HPI:  Pt is a 71 y.o. male who has a history of Primary osteoarthritis of left hip [M16.12]  with pain and limitations of activities of daily living who presents at this time for a LEFT TOTAL HIP [address] following the failure of conservative management. PMH:   Past Medical History:   Diagnosis Date    At risk for sleep apnea     DEVAUGHN 4- was tested years ago- and none    Autoimmune disease ([facility] [address]     rheumatoid arthritis    Bell's palsy     Diabetes ([facility] [address]     Hypertension     Lyme disease     Psoriasis     Psychiatric disorder     anxiety    PUD (peptic ulcer disease)     Staph infection     left arm       Body mass index is 37.8 kg/m². : A BMI > 30 is classified as obesity and > 40 is classified as morbid obesity. Medications upon admission :   Prior to Admission Medications   Prescriptions Last Dose Informant Patient Reported? Taking? SULFASALAZINE PO 1/13/2023  Yes No   Sig: Take 1,000 mg by mouth two (2) times a day. aspirin (ASPIRIN) 325 mg tablet 1/13/2023  Yes No   Sig: Take 325 mg by mouth daily. atorvastatin (LIPITOR) 80 mg tablet 1/17/2023 at 0500  Yes Yes   Sig: Take 80 mg by mouth daily.    carvediloL (COREG) 25 mg tablet 1/17/2023 at 0500 Yes Yes   Sig: Take 12.5 mg by mouth two (2) times daily (with meals). cholecalciferol, vitamin D3, 50 mcg (2,000 unit) tab 2023  Yes No   Sig: Take  by mouth. finasteride (PROSCAR) 5 mg tablet 2023  Yes No   Sig: Take 5 mg by mouth daily. folic acid (FOLVITE) 1 mg tablet 2023  Yes No   Sig: Take 3 mg by mouth.   gabapentin (NEURONTIN) 300 mg capsule 2023  Yes No   Si mg nightly. guaiFENesin ER (MUCINEX) 600 mg ER tablet 2023  Yes No   Sig: Take 600 mg by mouth every twelve (12) hours as needed for Congestion. ibuprofen (MOTRIN) 400 mg tablet 2023  Yes No   Sig: TAKE ONE TABLET BY MOUTH THREE TIMES A DAY AS NEEDED FOR PAIN AS DIRECTED   lisinopriL (PRINIVIL, ZESTRIL) 40 mg tablet 2023  Yes No   Sig: TAKE ONE TABLET BY MOUTH ONCE DAILY WILL REPLACE ENALAPRIL. THIS IS FOR BLOOD PRESSURE AND TO PROTECT THE  KIDNEYS   metFORMIN (GLUCOPHAGE) 500 mg tablet 2023  Yes No   Sig: Take 500 mg by mouth daily (with breakfast). tamsulosin (FLOMAX) 0.4 mg capsule 2023  Yes No   Sig: Take 0.8 mg by mouth daily. Facility-Administered Medications: None        Allergies:  No Known Allergies     Hospital Course: The patient underwent surgery. Complications:  None; patient tolerated the procedure well. Was taken to the PACU in stable condition and then transferred to the ortho floor. Perioperative Antibiotics:  Ancef     Postoperative Pain Management:  Tramadol & Tylenol     DVT Prophylaxis: Aspirin 325 in the morning, ASA 81 mg at night     Postoperative transfusions:    Number of units banked PRBCs =   none     Post Op complications: none    Hemoglobin at discharge:    Lab Results   Component Value Date/Time    HGB 11.3 (L) 2022 10:39 AM    INR 1.0 2022 10:39 AM       Dressing remained clean, dry and intact. No significant erythema or swelling. Wound appears to be healing without any evidence of infection.  Neurovascular exam found to be within normal limits. Physical Therapy started following surgery and participated in bed mobility, transfers and ambulation. Gait:  Gait  Base of Support: Widened  Speed/Jeri: Pace decreased (<100 feet/min)  Step Length: Right shortened, Left shortened  Gait Abnormalities: Antalgic, Decreased step clearance  Ambulation - Level of Assistance: Stand-by assistance  Distance (ft): 150 Feet (ft)  Assistive Device: Gait belt, Walker, rolling  Rail Use: Left  (prefers to use both hands on rail and go up/down sideways.)  Stairs - Level of Assistance: Contact guard assistance  Number of Stairs Trained: 13  Interventions: Safety awareness training, Verbal cues            Interventions: Safety awareness training, Verbal cues      Discharged to: Home with HH. Condition on Discharge:   stable    Discharge instructions:  - Anticoagulate with Aspirin    - Take pain medications as prescribed  - Resume pre hospital diet      - Discharge activity: activity as tolerated  - Ambulate with assistive device as needed. - Weight bearing status - WBAT  - Wound Care Keep wound clean and dry. See discharge instruction sheet. -DISCHARGE MEDICATION LIST     Current Discharge Medication List        START taking these medications    Details   acetaminophen (TYLENOL) 325 mg tablet Take 2 Tablets by mouth every six (6) hours for 7 days. Qty: 56 Tablet, Refills: 0  Start date: 1/17/2023, End date: 1/24/2023      polyethylene glycol (MIRALAX) 17 gram packet Take 1 Packet by mouth daily for 7 days. Qty: 7 Packet, Refills: 0  Start date: 1/18/2023, End date: 1/25/2023      senna-docusate (PERICOLACE) 8.6-50 mg per tablet Take 1 Tablet by mouth two (2) times a day for 7 days. Qty: 14 Tablet, Refills: 0  Start date: 1/17/2023, End date: 1/24/2023      famotidine (PEPCID) 20 mg tablet Take 1 Tablet by mouth two (2) times a day for 30 days.   Qty: 60 Tablet, Refills: 0  Start date: 1/17/2023, End date: 2/16/2023      aspirin delayed-release 81 mg tablet Take 1 Tablet by mouth nightly for 30 days. Qty: 30 Tablet, Refills: 0  Start date: 1/17/2023, End date: 2/16/2023      traMADoL (ULTRAM) 50 mg tablet Take 1-2 Tablets by mouth every six (6) hours as needed for Pain for up to 7 days. Max Daily Amount: 400 mg. One tab for mild to moderate pain level 1-6, or 2 tabs for severe pain level 7-10  Qty: 30 Tablet, Refills: 0  Start date: 1/17/2023, End date: 1/24/2023    Associated Diagnoses: Status post total replacement of left hip           CONTINUE these medications which have NOT CHANGED    Details   atorvastatin (LIPITOR) 80 mg tablet Take 80 mg by mouth daily. carvediloL (COREG) 25 mg tablet Take 12.5 mg by mouth two (2) times daily (with meals). guaiFENesin ER (MUCINEX) 600 mg ER tablet Take 600 mg by mouth every twelve (12) hours as needed for Congestion. tamsulosin (FLOMAX) 0.4 mg capsule Take 0.8 mg by mouth daily. SULFASALAZINE PO Take 1,000 mg by mouth two (2) times a day.      gabapentin (NEURONTIN) 300 mg capsule 600 mg nightly. ibuprofen (MOTRIN) 400 mg tablet TAKE ONE TABLET BY MOUTH THREE TIMES A DAY AS NEEDED FOR PAIN AS DIRECTED      folic acid (FOLVITE) 1 mg tablet Take 3 mg by mouth.      metFORMIN (GLUCOPHAGE) 500 mg tablet Take 500 mg by mouth daily (with breakfast). lisinopriL (PRINIVIL, ZESTRIL) 40 mg tablet TAKE ONE TABLET BY MOUTH ONCE DAILY WILL REPLACE ENALAPRIL. THIS IS FOR BLOOD PRESSURE AND TO PROTECT THE  KIDNEYS      finasteride (PROSCAR) 5 mg tablet Take 5 mg by mouth daily. cholecalciferol, vitamin D3, 50 mcg (2,000 unit) tab Take  by mouth. aspirin (ASPIRIN) 325 mg tablet Take 325 mg by mouth daily.           per medical continuation form      -Follow up in office in 2 weeks      Signed:  Selena Denny NP  Orthopaedic Nurse Practitioner    1/17/2023  3:55 PM

## 2023-01-17 NOTE — H&P
Date of Surgery Update:  Michelle Archie was seen and examined. History and physical has been reviewed. The patient has been examined. There have been no significant clinical changes since the completion of the originally dated History and Physical.  Patient identified by surgeon; surgical site was confirmed by patient and surgeon.     Signed By: Jose Byrd MD     January 17, 2023 8:53 AM

## 2023-01-17 NOTE — ANESTHESIA POSTPROCEDURE EVALUATION
Procedure(s):  LEFT TOTAL HIP ARTHROPLASTY ANTERIOR APPROACH.    spinal    Anesthesia Post Evaluation        Patient location during evaluation: PACU  Note status: Adequate. Level of consciousness: responsive to verbal stimuli and sleepy but conscious  Pain management: satisfactory to patient  Airway patency: patent  Anesthetic complications: no  Cardiovascular status: acceptable  Respiratory status: acceptable  Hydration status: acceptable  Comments: +Post-Anesthesia Evaluation and Assessment    Patient: Michelle Almanza MRN: [id number]  SSN: [id number]   YOB: 1950  Age: 71 y.o. Sex: male      Cardiovascular Function/Vital Signs    BP (!) 152/57   Pulse 65   Temp 36.6 °C (97.9 °F)   Resp 16   Ht 5' 9\" (1.753 m)   Wt 116.1 kg (255 lb 15.3 oz)   SpO2 100%   BMI 37.80 kg/m²     Patient is status post Procedure(s):  LEFT TOTAL HIP ARTHROPLASTY ANTERIOR APPROACH. Nausea/Vomiting: Controlled. Postoperative hydration reviewed and adequate. Pain:  Pain Scale 1: Numeric (0 - 10) (01/17/23 1125)  Pain Intensity 1: 0 (01/17/23 1125)   Managed. Neurological Status:   Neuro (WDL): Within Defined Limits (01/17/23 0847)   At baseline. Mental Status and Level of Consciousness: Arousable. Pulmonary Status:   O2 Device: Nasal cannula (01/17/23 1115)   Adequate oxygenation and airway patent. Complications related to anesthesia: None    Post-anesthesia assessment completed. No concerns. Signed By: Angie Vasquez MD    1/17/2023  Post anesthesia nausea and vomiting:  controlled      INITIAL Post-op Vital signs:   Vitals Value Taken Time   /40 01/17/23 1145   Temp 36.6 °C (97.9 °F) 01/17/23 1105   Pulse 74 01/17/23 1150   Resp 18 01/17/23 1150   SpO2 97 % 01/17/23 1150   Vitals shown include unvalidated device data.

## 2023-01-17 NOTE — PROGRESS NOTES
Patient meets d/c criteria, d/c instructions reviewed with patient and son, allowed time for questions, iv removed, patient transported to car in wheelchair

## 2023-01-17 NOTE — PROGRESS NOTES
Occupational Therapy  Orders received and medical record reviewed. Pt seen in PACU post PT session. Pt was able to stand without assistance and BP stable, no c/o dizziness. Pt was educated in home safety and fall prevention as well as adaptive equipment and techniques to help maintain good alignment of L CHARLES (on POD 0) during adls. Pt is anxious to discharge home where his son reports he will have  family assistance during his recovery. Pt was educated on and issued a kit of AE for lower body adls. Pt will need a RW and per PT, NP aware. Full OT note to follow. Pt is cleared by OT to discharge today with support of his family at home. Recommend HH OT at discharge.

## 2023-01-17 NOTE — PROGRESS NOTES
Problem: Self Care Deficits Care Plan (Adult)  Goal: *Acute Goals and Plan of Care (Insert Text)  Description: FUNCTIONAL STATUS PRIOR TO ADMISSION: pt lives alone. He reports that he has been dealing with his painful hip for the past 2 years. His family is supportive. Pt reports independence in adls and IADLs at baseline. He has some AE that he was using for his lower body PTA. Pt reports that his  toilet on the level one of his home is well suited for his hip issues. On level 2 rec, increased height and safety frame. HOME SUPPORT: family will be providing assistance as pt recovers per pt's son. Occupational Therapy Goals  Initiated 1/17/2023  1. Patient will perform lower body ADLs with AE supervision/set-up within 4 day(s). 2.  Patient will perform upper body ADLs standing 5 mins without fatigue or LOB with supervision/set-up within 4 day(s). 3.  Patient will perform toilet transfer with supervision/set-up within 4 day(s). 4.  Patient will perform all aspects of toileting with supervision/set-up within 4 day(s). 5.  Patient will participate in upper extremity therapeutic exercise/activities with supervision/set-up for 10 minutes within 4 day(s). 6.  Patient will utilize energy conservation techniques during functional activities without cues within 4 day(s). Outcome: Not Met   OCCUPATIONAL THERAPY EVALUATION  Patient: Miles Franco (20 y.o. male)  Date: 1/17/2023  Primary Diagnosis: Primary osteoarthritis of left hip [M16.12]  Osteoarthritis of left hip, unspecified osteoarthritis type [M16.12]  Procedure(s) (LRB):  LEFT TOTAL HIP ARTHROPLASTY ANTERIOR APPROACH (Left) Day of Surgery   Precautions:   WBAT (anterior hip precautions)    ASSESSMENT  Based on the objective data described below, the patient presents with POD 0 of L CHARLES in the PACU, hoping to discharge home with family support this date.   Pt participated in OT Evaluation and son was present - all questions were answered and recommendations made for using RW, AE for adls (issued this date), increase height of toilet and safety frame recommended for level 2 toilet. Pt was educated on home safety and fall prevention, importance of good nutrition for healing, and balancing rest and activity for best recovery. At this time, pt is doing well, needs supervision for upper body adls and up to min A for lower body adls. He will need supervision for adls for safety, and needs assistance for IADLs as he recovers from his surgery. Should pt spend the night, a care plan is in place to facilitate continued therapeutic benefit while hospitalized. At discharge recommend [facility] OT services     Current Level of Function Impacting Discharge (ADLs/self-care): up to min A for lower body adls using AE. Needs S for all adls at this time. Needs assistance for IADLs    Functional Outcome Measure: The patient scored 70/100 on the Barthel Index,   outcome measure which is indicative of minimally independent. .      Other factors to consider for discharge: lives alone will have family support per son; hx of RA     Patient will benefit from skilled therapy intervention to address the above noted impairments. PLAN :  Recommendations and Planned Interventions: self care training, functional mobility training, therapeutic exercise, balance training, therapeutic activities, endurance activities, patient education, home safety training, and family training/education    Frequency/Duration: Patient will be followed by occupational therapy 5 times a week to address goals.     Recommendation for discharge: (in order for the patient to meet his/her long term goals)  Occupational therapy at least 2 days/week in the home AND ensure assist and/or supervision for safety with adls and IADLs    This discharge recommendation:  Has not yet been discussed the attending provider and/or case management    IF patient discharges home will need the following DME: AE: long handled bathing, AE: long handled dressing, car seat, walker: rolling, and toilet adaptations to increase height and support       SUBJECTIVE:   Patient wishes to discharge home this date. Kaylan Castro    OBJECTIVE DATA SUMMARY:   HISTORY:   Past Medical History:   Diagnosis Date    At risk for sleep apnea     DEVAUGHN 4- was tested years ago- and none    Autoimmune disease ([facility] [address]     rheumatoid arthritis    Bell's palsy     Diabetes ([facility])     Hypertension     Lyme disease     Psoriasis     Psychiatric disorder     anxiety    PUD (peptic ulcer disease)     Staph infection     left arm     Past Surgical History:   Procedure Laterality Date    HX HEART CATHETERIZATION      HX ORTHOPAEDIC      left arm repair       Expanded or extensive additional review of patient history:     Home Situation  Home Environment: Private residence  # Steps to Enter: 2  Rails to Enter: No  [employer] :  (holds onto porch rail when going up.)  Wheelchair Ramp: No  One/Two Story Residence:  (upstairs toilet needs increased height and safety frame)  # of Interior Steps: [id number] [facility] Floor: Left  Living Alone: Yes  Support Systems: Child(carmina)  Patient Expects to be Discharged to[de-identified]  (needs OT home health)  Current DME Used/Available at Home:  (also has some AE that his wife used.   Has rollator at home no RW)  Tub or Shower Type: Shower (has ledge to step over)    Hand dominance: Right    EXAMINATION OF PERFORMANCE DEFICITS:  Cognitive/Behavioral Status:  Neurologic State: Alert  Orientation Level: Oriented X4  Cognition: Follows commands  Perception: Appears intact  Perseveration: No perseveration noted  Safety/Judgement: Awareness of environment;Good awareness of safety precautions;Decreased insight into deficits    Skin: incision dry--psoriasis area L knee    Edema: expected at incision site    Hearing:   Not assessed    Vision/Perceptual:                           Acuity: Within Defined Limits         Range of Motion:  BUEs:    AROM: Within functional limits  PROM: Generally decreased, functional                      Strength:  BUEs:  Strength: Generally decreased, functional                Coordination:  Coordination: Within functional limits  Fine Motor Skills-Upper: Left Intact; Right Intact    Gross Motor Skills-Upper: Left Intact; Right Intact    Tone & Sensation:    Tone: Normal  Sensation: Intact (intermittent L lateral hip tingling - chronic per patient)                      Balance:  Sitting:  (unable to reach to feet nor use crossed leg technique due to surgery L CHARLES anterior)  Standing: Intact; With support    Functional Mobility and Transfers for ADLs:  Bed Mobility:  Rolling:  (pt performed sit to and from standing this session, stable BP in standing)    Transfers:  Sit to Stand: Stand-by assistance  Stand to Sit: Stand-by assistance  Toilet Transfer :  (educ on equipment avail for safe toilet transfer--rec, toilet riser to increase height and a safety frame over the toilet in the level 2 bathroom)    ADL Assessment:  Feeding: Independent    Oral Facial Hygiene/Grooming: Stand-by assistance;Setup    Bathing: Moderate assistance (provided kit of AE for lower body adls--pt verbalized understanding)         Upper Body Dressing: Setup    Lower Body Dressing: Minimum assistance (educated on safe techniue, pt is familiar with reacher and sock aide.)    Toileting:  (pt able to use urinal independently in standing.)                ADL Intervention and task modifications:                                            Cognitive Retraining  Safety/Judgement: Awareness of environment;Good awareness of safety precautions;Decreased insight into deficits    Precautions: Patient recalled and demonstrated avoiding extreme planes of movement with Left LE during ADLs and functional mobility with minimal cues. Dressing joint: Patient instructed and demonstrated understanding to don/doff Left LE first/last with minimal cues.  Patient instructed and demonstrated to don all clothing while sitting prior to standing, doff all clothing to knees while standing, then sit to doff clothing off from knees to feet to facilitate fall prevention, pain management, and energy conservation with Supervision. Home safety: Patient instructed on home modifications and safety (raise height of ADL objects, appropriate height of chair surfaces, recliner safety, change of floor surfaces, clear pathways) to increase independence and fall prevention. Patient indicated understanding. Therapeutic Exercise:  Encouraged balancing rest and activity   Functional Measure:    Barthel Index:  Bathin  Bladder: 10  Bowels: 10  Groomin  Dressin  Feeding: 10  Mobility: 10  Stairs: 5  Toilet Use: 5  Transfer (Bed to Chair and Back): 10  Total: 70/100      The Barthel ADL Index: Guidelines  1. The index should be used as a record of what a patient does, not as a record of what a patient could do. 2. The main aim is to establish degree of independence from any help, physical or verbal, however minor and for whatever reason. 3. The need for supervision renders the patient not independent. 4. A patient's performance should be established using the best available evidence. Asking the patient, friends/relatives and nurses are the usual sources, but direct observation and common sense are also important. However direct testing is not needed. 5. Usually the patient's performance over the preceding 24-48 hours is important, but occasionally longer periods will be relevant. 6. Middle categories imply that the patient supplies over 50 per cent of the effort. 7. Use of aids to be independent is allowed. Score Interpretation (from [address] [facility])    Independent   60-79 Minimally independent   40-59 Partially dependent   20-39 Very dependent   <20 Totally dependent     -Ivy Ron., Barthel, D.W. (1965). Functional evaluation: the Barthel Index. [address] [facility] ([address] (1997). The Barthel activities of daily living index: self-reporting versus actual performance in the old (> or = 75 years). Journal of [address] [place] [address] 45(2), [facility], Darlene Zambrano., Lisa Lee. (1999). Measuring the change in disability after inpatient rehabilitation; comparison of the responsiveness of the Barthel Index and Functional [place] Measure. Journal of Neurology, Neurosurgery, and Psychiatry, 66(4), 381-171. IFEANYI Sykes, DANIEL Baum, & Mariano Inman M.A. (2004) Assessment of post-stroke quality of life in cost-effectiveness studies: The usefulness of the Barthel Index and the EuroQoL-5D. Quality of Life Research, 15, 853-78        Occupational Therapy Evaluation Charge Determination   History Examination Decision-Making   MEDIUM Complexity : Expanded review of history including physical, cognitive and psychosocial  history  MEDIUM Complexity : 3-5 performance deficits relating to physical, cognitive , or psychosocial skils that result in activity limitations and / or participation restrictions MEDIUM Complexity : Patient may present with comorbidities that affect occupational performnce. Miniml to moderate modification of tasks or assistance (eg, physical or verbal ) with assesment(s) is necessary to enable patient to complete evaluation       Based on the above components, the patient evaluation is determined to be of the following complexity level: MEDIUM  Pain Rating:  Did not rate pain    Activity Tolerance:   Fair and requires rest breaks  BP stable in standing. After treatment patient left in no apparent distress:    Seated EOB in PACU    COMMUNICATION/EDUCATION:   The patients plan of care was discussed with: Physical therapist, Registered nurse, and NP . Patient/family have participated as able in goal setting and plan of care. This patients plan of care is appropriate for delegation to [facility].     Thank you for this referral.  Jacklyn Hanson, OTR/L  Time Calculation: 28 mins

## 2023-01-17 NOTE — ANESTHESIA PREPROCEDURE EVALUATION
Relevant Problems   No relevant active problems       Anesthetic History   No history of anesthetic complications            Review of Systems / Medical History  Patient summary reviewed, nursing notes reviewed and pertinent labs reviewed    Pulmonary  Within defined limits        Undiagnosed apnea         Neuro/Psych         Psychiatric history     Cardiovascular    Hypertension              Exercise tolerance: >4 METS     GI/Hepatic/Renal           PUD     Endo/Other    Diabetes    Obesity     Other Findings              Physical Exam    Airway  Mallampati: II  TM Distance: 4 - 6 cm  Neck ROM: normal range of motion   Mouth opening: Normal     Cardiovascular  Regular rate and rhythm,  S1 and S2 normal,  no murmur, click, rub, or gallop             Dental  No notable dental hx       Pulmonary  Breath sounds clear to auscultation               Abdominal  GI exam deferred       Other Findings            Anesthetic Plan    ASA: 2  Anesthesia type: spinal            Anesthetic plan and risks discussed with: Patient

## 2023-01-17 NOTE — PERIOP NOTES
Handoff Report from Operating Room to PACU    Report received from [address] [place] [address]  and [facility] CRNA  regarding Isma Hernandez. Surgeon(s):  Kassie Kumar MD  And Procedure(s) (LRB):  LEFT TOTAL HIP ARTHROPLASTY ANTERIOR APPROACH (Left)  confirmed   with allergies and dressings discussed. Anesthesia type, drugs, patient history, complications, estimated blood loss, vital signs, intake and output, and last pain medication, lines, and reversal medications were reviewed. 1105 Pt arrived pacu Noe-syphrine gtt @60mcg    1110 Noe gtt decreased 50 mcg  1120 Noe gtt decreased 40 mcg    1128 pt a&o x3, emotional requesting to see son. Patient's son Filemon Padron able to visit with patient in pacu. 1130 noe gtt decreased 20 mcg   1135 noe gtt paused  1140 noe gtt stopped  1150 post op left hip xray completed in pacu. 1348 PT present to evaluate patient in pacu.   1410 room assignment pending, patient's son Filemon Nereida updated

## 2023-01-17 NOTE — PROGRESS NOTES
Problem: Mobility Impaired (Adult and Pediatric)  Goal: *Acute Goals and Plan of Care (Insert Text)  Description: FUNCTIONAL STATUS PRIOR TO ADMISSION: Patient was modified independent using a rolling walker and single point cane for functional mobility. Patient was modified independent for basic and instrumental ADLs. HOME SUPPORT PRIOR TO ADMISSION: The patient lived with son but did not require assist. Lives in 2 story home with 13 steps to upstairs bedroom/shower. Physical Therapy Goals  Initiated 1/17/2023    1. Patient will move from supine to sit and sit to supine , scoot up and down, and roll side to side in bed with modified independence within 4 days. 2. Patient will perform sit to stand with modified independence within 4 days. 3. Patient will ambulate with modified independence for 350 feet with the least restrictive device within 4 days. 4. Patient will ascend/descend 13 stairs with left handrail(s) with modified independence within 4 days. 5. Patient will verbalize and demonstrate understanding of anterior hip precautions per protocol within 4 days. 6. Patient will perform THR home exercise program per protocol with supervision/set-up within 4 days. Outcome: Progressing Towards Goal   PHYSICAL THERAPY EVALUATION  Patient: Judah Villa (14 y.o. male)  Date: 1/17/2023  Primary Diagnosis: Primary osteoarthritis of left hip [M16.12]  Osteoarthritis of left hip, unspecified osteoarthritis type [M16.12]  Procedure(s) (LRB):  LEFT TOTAL HIP ARTHROPLASTY ANTERIOR APPROACH (Left) Day of Surgery   Precautions:   WBAT (anterior hip precautions)    ASSESSMENT  Based on the objective data described below, the patient presents with mild L hip pain, decreased L hip strength, intact standing balance with support of RW and decreased mobility skills below PLOF noted above. He was lying on gurney table in PACU when PT arrived. Started patient on THR exercises with good tolerance.  Came to sitting with cg assistance and intact sitting balance. Stood with sba and RW for support. VSS from supine to sitting. Gait training in PACU with RW with cg assistance for 90 feet and re-took vitals. Still stable. Ambulated with RW to stairwell with cg assistance and provided stair training up/down 13 steps with 1 rail. Patient needed cg assistance and was most comfortable using both hands on 1 rail at this time. Returned to PACU [facility] and left in care of his RN. He has cleared PT for dc, but has not yet voided more than 50 mL. OT to see patient shortly. Will see tomorrow morning if he is not discharged. Current Level of Function Impacting Discharge (mobility/balance): cg assistance to sba as noted above. Functional Outcome Measure: The patient scored 70 on the Barthel outcome measure which is indicative of being minimally independent with ADL and mobility skills. Other factors to consider for discharge: son will assist patient once home; needs RW for home use as it provided more stable assistance than his rollator. Patient will benefit from skilled therapy intervention to address the above noted impairments. PLAN :  Recommendations and Planned Interventions: bed mobility training, transfer training, gait training, therapeutic exercises, neuromuscular re-education, modalities, edema management/control, patient and family training/education, and therapeutic activities      Frequency/Duration: Patient will be followed by physical therapy:  twice daily to address goals. Recommendation for discharge: (in order for the patient to meet his/her long term goals)  Physical therapy at least 2 days/week in the home     This discharge recommendation:  Has been made in collaboration with the attending provider and/or case management    IF patient discharges home will need the following DME: rolling walker     SUBJECTIVE:   Patient stated  I'm really to get out of here.      OBJECTIVE DATA SUMMARY:   HISTORY: Past Medical History:   Diagnosis Date    At risk for sleep apnea     DEVAUGHN 4- was tested years ago- and none    Autoimmune disease (HCC)     rheumatoid arthritis    Bell's palsy     Diabetes (HCC)     Hypertension     Lyme disease     Psoriasis     Psychiatric disorder     anxiety    PUD (peptic ulcer disease)     Staph infection     left arm     Past Surgical History:   Procedure Laterality Date    HX HEART CATHETERIZATION      HX ORTHOPAEDIC      left arm repair       Personal factors and/or comorbidities impacting plan of care: dm    Home Situation  Home Environment: Private residence  # Steps to Enter: 2  Rails to Enter: No  [employer] :  (holds onto porch rail when going up.)  Wheelchair Ramp: No  One/Two Story Residence: Two story  # of Interior Steps: 13  Interior Rails: Left  Living Alone: No  Support Systems: Child(carmina)  Patient Expects to be Discharged to[de-identified] Home with home health  Current DME Used/Available at Home: [place], straight, Walker, rollator (needs standared RW)    EXAMINATION/PRESENTATION/DECISION MAKING:   Critical Behavior:  Neurologic State: Alert  Orientation Level: Oriented X4  Cognition: Appropriate decision making, Appropriate for age attention/concentration, Appropriate safety awareness, Follows commands  Safety/Judgement: Awareness of environment, Good awareness of safety precautions, Decreased insight into deficits  Hearing:   good  Skin:  L hip dressing cdi  Edema: moderate L upper thigh  Range Of Motion:  AROM: Within functional limits           PROM: Generally decreased, functional           Strength:    Strength: Generally decreased, functional                    Tone & Sensation:   Tone: Normal              Sensation: Intact (intermittent L lateral hip tingling - chronic per patient)               Coordination:  Coordination: Within functional limits  Vision:   Acuity: Within Defined Limits  Functional Mobility:  Bed Mobility:  Rolling: Contact guard assistance  Supine to Sit: Contact guard assistance     Scooting: Stand-by assistance  Transfers:  Sit to Stand: Stand-by assistance  Stand to Sit: Stand-by assistance                       Balance:   Sitting: Intact  Standing: Intact; With support  Ambulation/Gait Training:  Distance (ft): 150 Feet (ft)  Assistive Device: Gait belt;Walker, rolling  Ambulation - Level of Assistance: Stand-by assistance     Gait Description (WDL): Exceptions to WDL  Gait Abnormalities: Antalgic;Decreased step clearance  Right Side Weight Bearing: Full  Left Side Weight Bearing: As tolerated  Base of Support: Widened     Speed/Jeri: Pace decreased (<100 feet/min)  Step Length: Right shortened;Left shortened        Interventions: Safety awareness training;Verbal cues            Stairs:  Number of Stairs Trained: 13  Stairs - Level of Assistance: Contact guard assistance   Rail Use: Left  (prefers to use both hands on rail and go up/down sideways.)    Therapeutic Exercises: Ankle pumps, quad sets, glut sets, heel slides. 10 reps each. Functional Measure:  Barthel Index:    Bathin  Bladder: 10  Bowels: 10  Groomin  Dressin  Feeding: 10  Mobility: 10  Stairs: 5  Toilet Use: 5  Transfer (Bed to Chair and Back): 10  Total: 70/100       The Barthel ADL Index: Guidelines  1. The index should be used as a record of what a patient does, not as a record of what a patient could do. 2. The main aim is to establish degree of independence from any help, physical or verbal, however minor and for whatever reason. 3. The need for supervision renders the patient not independent. 4. A patient's performance should be established using the best available evidence. Asking the patient, friends/relatives and nurses are the usual sources, but direct observation and common sense are also important. However direct testing is not needed.   5. Usually the patient's performance over the preceding 24-48 hours is important, but occasionally longer periods will be relevant. 6. Middle categories imply that the patient supplies over 50 per cent of the effort. 7. Use of aids to be independent is allowed. Score Interpretation (from [address] [facility])    Independent   60-79 Minimally independent   40-59 Partially dependent   20-39 Very dependent   <20 Totally dependent     -Ivy Ron., Barthel, D.W. (1965). Functional evaluation: the Barthel Index. [address] ([address] [place] (). The Barthel activities of daily living index: self-reporting versus actual performance in the old (> or = 75 years). Journal of [address] [place] [address] 45(7), [facility], Maame Brandt., Manny Ryan. (1999). Measuring the change in disability after inpatient rehabilitation; comparison of the responsiveness of the Barthel Index and Functional [place] Measure. Journal of Neurology, Neurosurgery, and Psychiatry, 66(4), 546-560. Amor Rdz, N.J.A, DANIEL Baum, & Leslee Ornelas MSAVANAH. (2004) Assessment of post-stroke quality of life in cost-effectiveness studies: The usefulness of the Barthel Index and the EuroQoL-5D.  Quality of Life Research, 15, 478-59        Physical Therapy Evaluation Charge Determination   History Examination Presentation Decision-Making   HIGH Complexity :3+ comorbidities / personal factors will impact the outcome/ POC  HIGH Complexity : 4+ Standardized tests and measures addressing body structure, function, activity limitation and / or participation in recreation  LOW Complexity : Stable, uncomplicated  Other outcome measures Barthel  LOW       Based on the above components, the patient evaluation is determined to be of the following complexity level: LOW     Pain Ratin/10 L hip     Activity Tolerance:   Good and SpO2 stable on RA    After treatment patient left in no apparent distress:   Call bell within reach, RN present at bedside, and sitting on edge of gurney    COMMUNICATION/EDUCATION: The patients plan of care was discussed with: Occupational therapist, Registered nurse, and NP . Fall prevention education was provided and the patient/caregiver indicated understanding., Patient/family have participated as able in goal setting and plan of care. , and Patient/family agree to work toward stated goals and plan of care.     Thank you for this referral.  Imer Hdez, PT   Time Calculation: 34 mins

## 2023-01-17 NOTE — DISCHARGE INSTRUCTIONS
Discharge Instructions Hip Replacement  Dr. Mabel Jasso    Patient Name  Gary Johnson  Date of procedure  1/17/2023    Procedure  Procedure(s): LEFT TOTAL HIP ARTHROPLASTY ANTERIOR APPROACH   Surgeon  Surgeon(s) and Role: Dr. Duke Service   Date of discharge: 1/17/2023    Follow up care  Follow up visit with Dr. Mabel Jasso in 4 weeks. Call [phone] extension [phone]) to make an appointment. If Home Health has been arranged for you, they will call you to arrange dates/times for visits. Call them if you do not hear from them within 24 hours after you go home. Activity at home  AVOID sudden and extreme movement of your hip (surgical leg)  Take a short walk every hour; except at night when sleeping. Do your Home Exercise Program 3 times every day. After exercising lie down and elevate your leg on pillows for 15-30 minutes to decrease swelling. Refer to your patient notebook for more information. Bathing and caring for your incision  You may take a shower with your waterproof dressing on your hip. The waterproof dressing is to stay on your hip for 7 days. On the 7th day have someone gently peel the dressing off by lifting the edge and stretching it to break the seal.  You may then leave your incision open to air unless you see drainage from your hip. Preventing blood clots  Take Aspirin for one month (30 days) following surgery  Call Dr. Mabel Jasso for signs of a blood clot in your leg: calf pain, tenderness, redness, swelling of lower leg   Preventing lung congestion  Use your incentive spirometer 4 times a day; do 10 repetitions each time  Remember to keep the small blue ball between the two arrows when taking a slow, deep breath   Pain Management  Get up and walk a short distance to relieve pain and stiffness. Place ice wrap on your hip except when you are walking. The gel ice packs should be changed about every 4 hours. Elevate your leg on pillows for 15-30 minutes.   Pain Medications  Take Tylenol 650mg (take two 325mg tablets) every 6 hours for the next 4 weeks. Take Meloxicam (Mobic) every day as prescribed to decrease swelling and inflammation. Do not take Meloxicam if you have had stomach ulcers  Take Famotidine (Pepcid) 20mg twice a day to prevent an upset stomach (if taking Aspirin and/or Meloxicam). If needed, take Tramadol (narcotic pain pill) every 6 hours as prescribed. If Tramadol has not relieved your pain within 1 hour, take Oxycodone 5mg (narcotic pain pill). Take Oxycodone only if needed. Stop taking once your pain is tolerable. Take all medications with a small amount of food. As your pain decreases, take the narcotics less often or take ½ of a pill. Call Dr. Sammy Martinez if you have side effects from your narcotic pain medication: itching, drowsiness, dizziness, upset stomach, dry mouth, constipation or if you medication is not relieving your pain. Diet after surgery  You may resume your normal diet. Include vegetables, fruit, whole grains, lean meats, and low-fat dairy products. Eat food high in fiber. Drink plenty of fluids, including 8 cups of water daily Take a stool softener (Senokot-s or Colace) to prevent constipation. If constipation occurs you may take a laxative (Milk of Magnesia, Dulcolax tablets). Avoid after surgery  Do not take any over-the-counter medication for pain except Tylenol  Do not take more than 3000mg (3 Grams) of Tylenol in 24 hours  Do not drink alcoholic beverages  Do not smoke  Do not drive until seen for follow up appointment  Do not place frozen gel pack directly on your skin. It can cause frostbite. Do not take a tub bath, swim or get in a hot tub for 8 weeks  Prevention of falls and safety at home  Set up an area where you can rest comfortably leaving space around furniture to allow you to walk with your walker. Keep stairs, hallways and bathrooms well lit; especially at night. Arrange for care for your pets.   Keep your home free of brandon. Call Dr. Claire Garvey at [phone] for:  Pain that is not relieved by pain medication, ice and activity  Side effects of medications  Increased/spread of bruising  Warning signs of infection:  persistent fever greater than 100 degrees  shaking or chills  increased redness, tenderness, swelling or drainage from incision  increased pain during activity or rest  Warning signs of a blood clot in your leg:  increased pain in your calf  tenderness or redness  increased swelling or knee, calf, ankle or foot    Call [phone] after 5pm or on a weekend.  The on call physician will return your phone call  Call your Primary Care Doctor for:   Concerns about your medical conditions such as diabetes, high blood pressure, asthma, congestive heart failure  Blood sugars greater than 180  Persistent headache or dizziness  Coughing or congestion  Constipation or diarrhea  Burning when you go to the bathroom  Abnormal heart rate (fast or slow)      Call 911 and go to the nearest hospital for:   Sudden increased shortness of breath  Sudden onset of chest pain  Difficulty breathing  Localized chest pain with coughing or taking a deep breath

## 2023-01-17 NOTE — PROGRESS NOTES
CM received phone call from PACU that patient needed home health and rolling walker for discharge. CM has spoken to patient and offered FOC. Patient would like [facility] ([facility] Referral sent via [facility] and patient accepted. Information placed on AVS.    Referral also sent to [facility] for rolling walker - patient was approved and rolling walker delivered to PACU by ortho NP - CM to replace rolling walker from [facility] to Ortho supply closet.     Kerri La, RN, BSN, [address] [place] [address]  Manager of Case Management  [phone]

## 2023-01-17 NOTE — PROGRESS NOTES
Ortho / Neurosurgery NP Note    POD# 0  s/p LEFT TOTAL HIP ARTHROPLASTY ANTERIOR APPROACH     Pt seen in PACU. Awake and alert. Very motivated to work with therapy and return home today. Reports minimal post-op pain, he would like to use tylenol and ibuprofen at discharge. No other complaints. Tolerating clears. No nausea. VSS Afebrile. Room air. Visit Vitals  BP (!) 148/77   Pulse (!) 103   Temp 97.8 °F (36.6 °C)   Resp 28   Ht 5' 9\" (1.753 m)   Wt 116.1 kg (255 lb 15.3 oz)   SpO2 96%   BMI 37.80 kg/m²       Voiding status: able to void 100 ml   Output (mL)  Urine Voided: 100 ml (01/17/23 1425)  Unmeasurable Output  Urine Occurrence(s): 1 (01/17/23 1425)      Labs    Lab Results   Component Value Date/Time    HGB 11.3 (L) 12/20/2022 10:39 AM      Lab Results   Component Value Date/Time    INR 1.0 12/20/2022 10:39 AM      Lab Results   Component Value Date/Time    Sodium 140 12/20/2022 10:39 AM    Potassium 3.8 12/20/2022 10:39 AM    Chloride 105 12/20/2022 10:39 AM    CO2 28 12/20/2022 10:39 AM    Glucose 93 12/20/2022 10:39 AM    BUN 18 12/20/2022 10:39 AM    Creatinine 0.75 12/20/2022 10:39 AM    Calcium 9.4 12/20/2022 10:39 AM     Recent Glucose Results:   Lab Results   Component Value Date/Time    GLUCPOC 131 (H) 01/17/2023 02:37 PM    GLUCPOC 97 01/17/2023 08:20 AM           Body mass index is 37.8 kg/m². : A BMI > 30 is classified as obesity and > 40 is classified as morbid obesity. Dressing c.d.i  Cryotherapy in place over incision  Calves soft and supple; No pain with passive stretch  Sensation and motor intact. +PF/DF/EHL intact   SCDs for mechanical DVT proph while in bed     PLAN:  1) PT BID, OT - WBAT  2) Aspirin 325 mg in the morning and ASA 81 mg at night for DVT Prophylaxis   3) GI Prophylaxis - Pepcid  4) Pain control - scheduled tylenol, and prn  tramadol    5) HX BPH - flomax and proscar resumed. Check PVR to ensure emptying.    6) Readniess for discharge:     [x] Vital Signs stable    [] Hgb stable    [x] + Voiding    [x] Wound intact, drainage minimal    [x] Tolerating PO intake     [x] Cleared by PT (OT if applicable)     [x] Stair training completed (if applicable)    [x] Independent / Contact Guard Assist (household distance)     [x] Bed mobility     [x] Car transfers     [] ADLs    [x] Adequate pain control on oral medication alone     Discharge home today if able to clear therapy and minimal on bladder scan.      Bart Kim NP

## 2023-01-17 NOTE — PERIOP NOTES
TRANSFER - OUT REPORT:    Verbal report given to Trainum RN (name) on Ngoc Riddle  being transferred to phase 2 (unit) for discharge to home with follow with  home health care. Report consisted of patients Situation, Background, Assessment and   Recommendations(SBAR). Information from the following report(s) SBAR, OR Summary, Procedure Summary, Intake/Output, MAR, Recent Results, and Cardiac Rhythm NSR   was reviewed with the receiving nurse. Opportunity for questions and clarification was provided.       Patient transported with:   Registered Nurse

## 2023-01-17 NOTE — H&P
H and P    Subjective:         Gary Johnson is a 71 y.o. male who presents for left hip CHARLES after failure conservative mgmt    There are no problems to display for this patient. Family History   Problem Relation Age of Onset    No Known Problems Mother     Cancer Father         mets- brain, lungs, bone    No Known Problems Sister     Hypertension Brother         Oxygen- from COVID infection      Social History     Tobacco Use    Smoking status: Former     Types: Cigarettes     Quit date:      Years since quittin.0    Smokeless tobacco: Never   Substance Use Topics    Alcohol use: Not on file     Past Medical History:   Diagnosis Date    At risk for sleep apnea     DEVAUGHN 4- was tested years ago- and none    Autoimmune disease ([facility] [address]     rheumatoid arthritis    Bell's palsy     Diabetes ([facility] [address]     Hypertension     Lyme disease     Psoriasis     Psychiatric disorder     anxiety    PUD (peptic ulcer disease)     Staph infection     left arm      Past Surgical History:   Procedure Laterality Date    HX HEART CATHETERIZATION      HX ORTHOPAEDIC      left arm repair      Prior to Admission medications    Medication Sig Start Date End Date Taking? Authorizing Provider   atorvastatin (LIPITOR) 80 mg tablet Take 80 mg by mouth daily. 22  Yes Provider, Historical   carvediloL (COREG) 25 mg tablet Take 12.5 mg by mouth two (2) times daily (with meals). Yes Provider, Historical   guaiFENesin ER (MUCINEX) 600 mg ER tablet Take 600 mg by mouth every twelve (12) hours as needed for Congestion. Provider, Historical   tamsulosin (FLOMAX) 0.4 mg capsule Take 0.8 mg by mouth daily. Provider, Historical   SULFASALAZINE PO Take 1,000 mg by mouth two (2) times a day. Provider, Historical   gabapentin (NEURONTIN) 300 mg capsule 600 mg nightly.  10/5/22   Provider, Historical   ibuprofen (MOTRIN) 400 mg tablet TAKE ONE TABLET BY MOUTH THREE TIMES A DAY AS NEEDED FOR PAIN AS DIRECTED 3/23/22 3/24/23  Provider, Historical   folic acid (FOLVITE) 1 mg tablet Take 3 mg by mouth. 10/26/22   Provider, Historical   metFORMIN (GLUCOPHAGE) 500 mg tablet Take 500 mg by mouth daily (with breakfast). 22   Provider, Historical   lisinopriL (PRINIVIL, ZESTRIL) 40 mg tablet TAKE ONE TABLET BY MOUTH ONCE DAILY WILL REPLACE ENALAPRIL. THIS IS FOR BLOOD PRESSURE AND TO PROTECT THE  KIDNEYS 22  Provider, Historical   finasteride (PROSCAR) 5 mg tablet Take 5 mg by mouth daily. 22   Provider, Historical   cholecalciferol, vitamin D3, 50 mcg (2,000 unit) tab Take  by mouth. Provider, Historical   aspirin (ASPIRIN) 325 mg tablet Take 325 mg by mouth daily. Other, MD Scott     Current Facility-Administered Medications   Medication Dose Route Frequency    dexamethasone (DECADRON) 4 mg/mL injection 10 mg  10 mg IntraVENous ONCE    tranexamic acid (CYKLOKAPRON) 1,000 mg in 0.9% sodium chloride 100 mL IVPB  1 g IntraVENous ONCE    ceFAZolin (ANCEF) 2 g in sterile water (preservative free) 20 mL IV syringe  2 g IntraVENous ONCE    lactated Ringers infusion  25 mL/hr IntraVENous CONTINUOUS    sodium chloride (NS) flush 5-40 mL  5-40 mL IntraVENous Q8H    sodium chloride (NS) flush 5-40 mL  5-40 mL IntraVENous PRN    lidocaine (PF) (XYLOCAINE) 10 mg/mL (1 %) injection 0.1 mL  0.1 mL SubCUTAneous PRN    alcohol 62% (NOZIN) nasal  3 Ampule  3 Ampule Topical ONCE      No Known Allergies     Review of Systems:    Negative for fevers, chills, nausea, vomiting, chest pain, shortness of breath, headaches.               Objective:     Patient Vitals for the past 24 hrs:   Temp Pulse Resp BP SpO2   23 0804 98 °F (36.7 °C) 85 17 (!) 163/78 95 %       Temp (24hrs), Av °F (36.7 °C), Min:98 °F (36.7 °C), Max:98 °F (36.7 °C)      Gen: NAD, A&Ox3  Resp: Non-labored breathing  CV: Extremities well perfused  Abd: soft, NT  LLE: pain with ROM, pos stinchfield, skin intact, warm well perfused, SILT in al distributions L3-S1, palpable pedal pulses, no calf tenderness    Imaging Review: End stage left hip OA    Labs:   Recent Results (from the past 24 hour(s))   GLUCOSE, POC    Collection Time: 01/17/23  8:20 AM   Result Value Ref Range    Glucose (POC) 97 65 - 117 mg/dL    Performed by [address] [place] [address], [address]    Collection Time: 01/17/23  8:22 AM   Result Value Ref Range    Crossmatch Expiration 01/20/2023,2359     ABO/Rh(D) PENDING     Antibody screen PENDING          Current Facility-Administered Medications   Medication Dose Route Frequency    dexamethasone (DECADRON) 4 mg/mL injection 10 mg  10 mg IntraVENous ONCE    tranexamic acid (CYKLOKAPRON) 1,000 mg in 0.9% sodium chloride 100 mL IVPB  1 g IntraVENous ONCE    ceFAZolin (ANCEF) 2 g in sterile water (preservative free) 20 mL IV syringe  2 g IntraVENous ONCE    lactated Ringers infusion  25 mL/hr IntraVENous CONTINUOUS    sodium chloride (NS) flush 5-40 mL  5-40 mL IntraVENous Q8H    sodium chloride (NS) flush 5-40 mL  5-40 mL IntraVENous PRN    lidocaine (PF) (XYLOCAINE) 10 mg/mL (1 %) injection 0.1 mL  0.1 mL SubCUTAneous PRN    alcohol 62% (NOZIN) nasal  3 Ampule  3 Ampule Topical ONCE         Impression:     Active Problems:    * No active hospital problems. *  71 yo male with end stage left hip OA    Plan:   Plan for L CHARLES    Risks and benefits of joint arthroplasty discussed at length including but not limited to bleeding, need for blood transfusion, infection, damage to surrounding structures, intra-operative fracture, blood clots, pulmonary embolism, death. The patient understands the risks of surgery. All questions answered. They elected to move forward.          Martina Walker MD

## 2023-01-24 DIAGNOSIS — Z96.642 STATUS POST TOTAL REPLACEMENT OF LEFT HIP: ICD-10-CM

## 2023-01-25 RX ORDER — TRAMADOL HYDROCHLORIDE 50 MG/1
50-100 TABLET ORAL
Qty: 30 TABLET | Refills: 0 | Status: SHIPPED | OUTPATIENT
Start: 2023-01-25 | End: 2023-02-01

## 2024-10-09 ENCOUNTER — HOSPITAL ENCOUNTER (EMERGENCY)
Facility: HOSPITAL | Age: 74
Discharge: HOME | End: 2024-10-09
Attending: STUDENT IN AN ORGANIZED HEALTH CARE EDUCATION/TRAINING PROGRAM
Payer: COMMERCIAL

## 2024-10-09 ENCOUNTER — APPOINTMENT (OUTPATIENT)
Facility: HOSPITAL | Age: 74
End: 2024-10-09
Payer: COMMERCIAL

## 2024-10-09 VITALS
BODY MASS INDEX: 40.88 KG/M2 | HEART RATE: 68 BPM | OXYGEN SATURATION: 96 % | SYSTOLIC BLOOD PRESSURE: 179 MMHG | TEMPERATURE: 97.8 F | HEIGHT: 69 IN | WEIGHT: 276 LBS | RESPIRATION RATE: 18 BRPM | DIASTOLIC BLOOD PRESSURE: 92 MMHG

## 2024-10-09 DIAGNOSIS — M25.552 LEFT HIP PAIN: Primary | ICD-10-CM

## 2024-10-09 LAB — ECHO BSA: 2.47 M2

## 2024-10-09 PROCEDURE — 99284 EMERGENCY DEPT VISIT MOD MDM: CPT

## 2024-10-09 PROCEDURE — 73502 X-RAY EXAM HIP UNI 2-3 VIEWS: CPT

## 2024-10-09 PROCEDURE — 93971 EXTREMITY STUDY: CPT

## 2024-10-09 ASSESSMENT — PAIN DESCRIPTION - LOCATION: LOCATION: HIP;GROIN

## 2024-10-09 ASSESSMENT — PAIN DESCRIPTION - DESCRIPTORS: DESCRIPTORS: ACHING

## 2024-10-09 ASSESSMENT — PAIN - FUNCTIONAL ASSESSMENT: PAIN_FUNCTIONAL_ASSESSMENT: 0-10

## 2024-10-09 ASSESSMENT — PAIN SCALES - GENERAL: PAINLEVEL_OUTOF10: 7

## 2024-10-09 ASSESSMENT — PAIN DESCRIPTION - ORIENTATION: ORIENTATION: LEFT

## 2024-10-09 NOTE — ED PROVIDER NOTES
[facility] EMERGENCY DEPT  EMERGENCY DEPARTMENT ENCOUNTER      Pt Name: Wilfredo Pompa  MRN: [id number]  Birthdate 1950  Date of evaluation: 10/9/2024  Provider: Kristy Chilel MD    CHIEF COMPLAINT       Chief Complaint   Patient presents with    Leg Pain         HISTORY OF PRESENT ILLNESS   (Location/Symptom, Timing/Onset, Context/Setting, Quality, Duration, Modifying Factors, Severity)  Note limiting factors.   Wilfredo Pompa is a 75yo with pmhx of T2DM, HTN, LLE DVT, left hip replacement, who presents for 2 day hx of left hip pain. Pain started after patient slipped on some acorns in his front yard, shifted his weight incorrectly on his left hip, however he did not fall or report any hip trauma. Afterwards, patient has had left hip pain, mainly in hip joint and radiated somewhat to upper thigh. Reports swelling and tingling in the area, but no numbness. Denies saddle anesthesia, bowel or urinary incontinence, fevers, chills. Of note, patient has prior left hip replacement two years ago, and also has history of left LE DVT years ago.              Review of External Medical Records:     Nursing Notes were reviewed.    REVIEW OF SYSTEMS    (2-9 systems for level 4, 10 or more for level 5)     Review of Systems   All other systems reviewed and are negative.      Except as noted above the remainder of the review of systems was reviewed and negative.       PAST MEDICAL HISTORY     Past Medical History:   Diagnosis Date    At risk for sleep apnea     AFIA 4- was tested years ago- and none    Autoimmune disease (HCC)     rheumatoid arthritis    Bell's palsy     Diabetes (HCC)     Hypertension     Lyme disease     Psoriasis     Psychiatric disorder     anxiety    PUD (peptic ulcer disease)     Staph infection     left arm         SURGICAL HISTORY       Past Surgical History:   Procedure Laterality Date    CARDIAC CATHETERIZATION      ORTHOPEDIC SURGERY      left arm repair         CURRENT MEDICATIONS

## 2024-10-09 NOTE — ED TRIAGE NOTES
Patient ambulatory to triage by self. Patient stated he had hip surgery couple years ago and having sharp pain in left groin area for past couple of days. More intense today. He thought he pulled a muscle due to sliding on  some acorns on his porch. Patient reports minor swelling in thigh region. Feels slightly warm. Has numbness and tingling in bilateral lower extremities but that is normal for him. Denies swelling in foot, lower leg or groin. No urinary symptoms.

## (undated) DEVICE — SUTURE VCRL SZ 2-0 L36IN ABSRB UD L40MM CT 1/2 CIR J957H

## (undated) DEVICE — SUTURE ETHBND EXCEL SZ 2 L30IN NONABSORBABLE GRN L40MM V-37 MX69G

## (undated) DEVICE — TRANSFER SET 3": Brand: MEDLINE INDUSTRIES, INC.

## (undated) DEVICE — DRESSING HYDROCOLLOID BORDER 35X10 IN ALUM PRIMASEAL

## (undated) DEVICE — SPONGE GZ W4XL4IN COT 12 PLY TYP VII WVN C FLD DSGN STERILE

## (undated) DEVICE — APPLICATOR MEDICATED 26 CC SOLUTION HI LT ORNG CHLORAPREP

## (undated) DEVICE — T4 HOOD

## (undated) DEVICE — ADHESIVE SKIN CLSR 0.7ML TOP DERMBND ADV

## (undated) DEVICE — RECIPROCATING BLADE HEAVY DUTY, OFFSET  (77.5 X 1.23 X 11.0MM)

## (undated) DEVICE — SUTURE MCRYL SZ 3-0 L27IN ABSRB UD L19MM PS-2 3/8 CIR PRIM Y427H

## (undated) DEVICE — SOLUTION IRRIG 1000ML STRL H2O USP PLAS POUR BTL

## (undated) DEVICE — KIT POS FOAM HANA TBL

## (undated) DEVICE — C-ARM: Brand: UNBRANDED

## (undated) DEVICE — ALCOHOL  RUBBING  70% ISOPROPYL  4-OZ

## (undated) DEVICE — TOTAL JOINT-MRMC: Brand: MEDLINE INDUSTRIES, INC.

## (undated) DEVICE — HYPODERMIC SAFETY NEEDLE: Brand: MAGELLAN

## (undated) DEVICE — SUTURE STRATAFIX SYMMETRIC SZ 1 L18IN ABSRB VLT CT1 L36CM SXPP1A404

## (undated) DEVICE — HANDPIECE SET WITH COAXIAL HIGH FLOW TIP AND SUCTION TUBE: Brand: INTERPULSE

## (undated) DEVICE — 6619 2 PTNT ISO SYS INCISE AREA&LT;(&GT;&&LT;)&GT;P: Brand: STERI-DRAPE™ IOBAN™ 2

## (undated) DEVICE — SYRINGE MED 30ML STD CLR PLAS LUERLOCK TIP N CTRL DISP

## (undated) DEVICE — GLOVE ORTHO 8   MSG9480

## (undated) DEVICE — GLOVE SURG SZ 65 L12IN FNGR THK79MIL GRN LTX FREE

## (undated) DEVICE — GLOVE SURG SZ 8 L12IN FNGR THK94MIL STD WHT LTX FREE

## (undated) DEVICE — SOLUTION SURG PREP 26 CC PURPREP

## (undated) DEVICE — PADDING CAST SPEC 6INX4YD COT --

## (undated) DEVICE — BLADE ELECTRODE: Brand: EDGE

## (undated) DEVICE — COVER,MAYO STAND,STERILE: Brand: MEDLINE

## (undated) DEVICE — GLOVE SURG SZ 65 THK91MIL LTX FREE SYN POLYISOPRENE

## (undated) DEVICE — SUTURE VCRL L54CM ABSRB VLT POLYGLACTIN 910 W/O NDL J617H

## (undated) DEVICE — SOLUTION IRRIG 3000ML 0.9% SOD CHL USP UROMATIC PLAS CONT